# Patient Record
Sex: MALE | Race: WHITE | Employment: OTHER | URBAN - METROPOLITAN AREA
[De-identification: names, ages, dates, MRNs, and addresses within clinical notes are randomized per-mention and may not be internally consistent; named-entity substitution may affect disease eponyms.]

---

## 2019-12-20 ENCOUNTER — HOSPITAL ENCOUNTER (OUTPATIENT)
Dept: ULTRASOUND IMAGING | Age: 44
Discharge: HOME | End: 2019-12-22

## 2019-12-20 DIAGNOSIS — L53.9 ERYTHEMATOUS CONDITION: ICD-10-CM

## 2019-12-20 DIAGNOSIS — M79.601 PAIN OF RIGHT ARM: ICD-10-CM

## 2019-12-20 PROCEDURE — 93971 EXTREMITY STUDY: CPT

## 2021-01-28 ENCOUNTER — OFFICE VISIT (OUTPATIENT)
Dept: FAMILY MEDICINE CLINIC | Age: 46
End: 2021-01-28

## 2021-01-28 VITALS
SYSTOLIC BLOOD PRESSURE: 148 MMHG | TEMPERATURE: 97.9 F | RESPIRATION RATE: 18 BRPM | HEART RATE: 91 BPM | WEIGHT: 236 LBS | DIASTOLIC BLOOD PRESSURE: 80 MMHG | OXYGEN SATURATION: 98 %

## 2021-01-28 DIAGNOSIS — L03.90 CELLULITIS, UNSPECIFIED CELLULITIS SITE: ICD-10-CM

## 2021-01-28 DIAGNOSIS — L02.91 ABSCESS: Primary | ICD-10-CM

## 2021-01-28 PROCEDURE — 99213 OFFICE O/P EST LOW 20 MIN: CPT | Performed by: PHYSICIAN ASSISTANT

## 2021-01-28 NOTE — PROGRESS NOTES
2021   Deandra [address] [place] [address]  [phone]    Hien qian [facility]  : 1975  Age: 39 y.o. Sex: male    Subjective:  Chief Complaint   Patient presents with    Cyst     on chin       HPI:  The patient states that they have noticed erythema over the chin for the last 14 days. The patient states that the area has gradually increased in redness and size since it started. Patient states the wound is red no warmth. The patient denies any wounds or breaks in the skin. Denies any trauma or injury. Denies fever of chills. Denies any drainage. Denies red streaking. Denies nausea, vomiting, malaise and or fatigue. Patient comes for evaluation. ROS:  Positive and pertinent negatives as per HPI. All other systems are reviewed and negative. No current outpatient medications on file. No Known Allergies     Objective:  Vitals:    21 1650   BP: (!) 148/80   Site: Left Upper Arm   Position: Sitting   Cuff Size: Medium Adult   Pulse: 91   Resp: 18   Temp: 97.9 °F (36.6 °C)   TempSrc: Temporal   SpO2: 98%   Weight: 236 lb (107 kg)        Exam:  Const: Appears healthy and well developed. No signs of acute distress present. Head/Face: Head is normocephalic, atraumatic. Facies is symmetric. ENMT: Buccal mucosa moist.  Neck: Trachea midline. No lymphoadenopathy or meningeal signs noted. Resp: No respiratory distress. Musculo: Pulses are equal bilaterally. Skin: Dry and warm. The patient does have erythema measuring 2 x 2.5 cm area to the chin no warmth with induration without fluctuance. It is tender to palpation. No open wounds or soft tissue swelling. No abrasions or ecchymosis is noted. No red streaking or lymphadenopathy noted. Neuro: Alert and oriented x3. Speech is intact. Psych: Mood and affect are appropriate to situation. I do not feel that this abscess is ready to be drained at this time.   I did instruct patient warm compresses several times daily for 15 minutes at a time. I did instruct patient the importance of following up with PCP for recheck in 2 to 3 days. ER warning signs given  Discussed with patient the use of probiotics to assist with adverse GI effects including C-diff. MARIA ISABEL was seen today for cyst.    Diagnoses and all orders for this visit:    Abscess    Cellulitis, unspecified cellulitis site    Patient was given a prescription for Bactrim and Keflex follow-up with his PCP in 2 to 3 days  Return for Follow up with PCP in 2-3 days for re-evaluation. .    Seen By:    AKIKO Garcia

## 2021-02-25 ENCOUNTER — OFFICE VISIT (OUTPATIENT)
Dept: FAMILY MEDICINE CLINIC | Age: 46
End: 2021-02-25

## 2021-02-25 VITALS
OXYGEN SATURATION: 96 % | HEART RATE: 98 BPM | WEIGHT: 234 LBS | SYSTOLIC BLOOD PRESSURE: 142 MMHG | DIASTOLIC BLOOD PRESSURE: 90 MMHG | TEMPERATURE: 97.9 F

## 2021-02-25 DIAGNOSIS — L03.90 CELLULITIS, UNSPECIFIED CELLULITIS SITE: Primary | ICD-10-CM

## 2021-02-25 PROCEDURE — 99213 OFFICE O/P EST LOW 20 MIN: CPT | Performed by: PHYSICIAN ASSISTANT

## 2021-02-25 RX ORDER — CEPHALEXIN 500 MG/1
500 CAPSULE ORAL 3 TIMES DAILY
Qty: 30 CAPSULE | Refills: 0 | Status: SHIPPED
Start: 2021-02-25 | End: 2021-03-23

## 2021-02-25 RX ORDER — SULFAMETHOXAZOLE AND TRIMETHOPRIM 800; 160 MG/1; MG/1
1 TABLET ORAL 2 TIMES DAILY
Qty: 14 TABLET | Refills: 0 | Status: SHIPPED | OUTPATIENT
Start: 2021-02-25 | End: 2021-03-04

## 2021-02-25 NOTE — PROGRESS NOTES
2021   Deandra [address]  [address] [facility]  [phone]    Breezy Zamudio [facility] ([place], [place]  : 1975  Age: 39 y.o. Sex: male    Subjective:  Chief Complaint   Patient presents with    Cellulitis     follow up chin        HPI:  The patient had cellulitis abscess over a month ago states it resolved with antibiotics. Patient states he noticed noticed erythema over the left side of the face below the lower lip for the last few days. The patient states that the area has gradually increased in redness and size since it started. Patient states the wound is red and warm. The patient denies any wounds or breaks in the skin. Denies any trauma or injury. Denies fever of chills. Denies any drainage. Denies red streaking. Denies nausea, vomiting, malaise and or fatigue. Patient comes for evaluation. ROS:  Positive and pertinent negatives as per HPI. All other systems are reviewed and negative. Current Outpatient Medications:     sulfamethoxazole-trimethoprim (BACTRIM DS;SEPTRA DS) 800-160 MG per tablet, Take 1 tablet by mouth 2 times daily for 7 days, Disp: 14 tablet, Rfl: 0    cephALEXin (KEFLEX) 500 MG capsule, Take 1 capsule by mouth 3 times daily, Disp: 30 capsule, Rfl: 0   Allergies   Allergen Reactions    Pcn [Penicillins]      As a child         Objective:  Vitals:    21 1613   BP: (!) 142/90   Pulse: 98   Temp: 97.9 °F (36.6 °C)   TempSrc: Temporal   SpO2: 96%   Weight: 234 lb (106.1 kg)        Exam:  Const: Appears healthy and well developed. No signs of acute distress present. Head/Face: Head is normocephalic, atraumatic. Facies is symmetric. ENMT: Buccal mucosa moist.  Neck: Trachea midline. No lymphoadenopathy or meningeal signs noted. Resp: No respiratory distress. Musculo: Pulses are equal bilaterally. Skin: Dry and warm. The patient does have erythema no warmth to the face on the left side inferior to the left lower lip.   It measures approximately 1.5 x 2 cm without fluctuance or induration. It is tender to palpation. No open wounds or soft tissue swelling. No abrasions or ecchymosis is noted. No red streaking or lymphadenopathy noted. Neuro: Alert and oriented x3. Speech is intact. Psych: Mood and affect are appropriate to situation. Discussed with patient the use of probiotics to assist with adverse GI effects including C-diff. MARIA ISABEL was seen today for cellulitis. Diagnoses and all orders for this visit:    Cellulitis, unspecified cellulitis site    Other orders  -     sulfamethoxazole-trimethoprim (BACTRIM DS;SEPTRA DS) 800-160 MG per tablet; Take 1 tablet by mouth 2 times daily for 7 days  -     cephALEXin (KEFLEX) 500 MG capsule; Take 1 capsule by mouth 3 times daily    Patient was instructed warm compresses to the area several times daily for 15 minutes at a time  Return for Follow up with PCP in 5 days for re-evaluation. .    Seen By:    AKIKO Hobbs

## 2021-03-02 ENCOUNTER — TELEPHONE (OUTPATIENT)
Dept: ADMINISTRATIVE | Age: 46
End: 2021-03-02

## 2021-03-02 ENCOUNTER — NURSE TRIAGE (OUTPATIENT)
Dept: OTHER | Facility: CLINIC | Age: 46
End: 2021-03-02

## 2021-03-02 NOTE — TELEPHONE ENCOUNTER
Patient called ECC/PSC Tk with red flag complaint to establish care with Layton Batista    Brief description of triage:     Triage indicates for patient to see today in office. If no availability go to Er/Ucc today    Care advice provided, patient verbalizes understanding; denies any other questions or concerns; instructed to call back for any new or worsening symptoms. Writer provided warm transfer to  at [facility] [place] for appointment scheduling. Attention Provider: Thank you for allowing me to participate in the care of your patient. The patient was connected to triage in response to information provided to the ECC. Please do not respond through this encounter as the response is not directed to a shared pool. Reason for Disposition   MODERATE swelling of both ankles (e.g., swelling extends up to the knees) AND new onset or worsening    Answer Assessment - Initial Assessment Questions  1. ONSET: \"When did the swelling start? \" (e.g., minutes, hours, days)      Last Friday    2. LOCATION: \"What part of the leg is swollen? \"  \"Are both legs swollen or just one leg? \"      Bilateral lower extremities    3. SEVERITY: \"How bad is the swelling? \" (e.g., localized; mild, moderate, severe)   - Localized - small area of swelling localized to one leg   - MILD pedal edema - swelling limited to foot and ankle, pitting edema < 1/4 inch (6 mm) deep, rest and elevation eliminate most or all swelling   - MODERATE edema - swelling of lower leg to knee, pitting edema > 1/4 inch (6 mm) deep, rest and elevation only partially reduce swelling   - SEVERE edema - swelling extends above knee, facial or hand swelling present       Swelling in feet and ankles up to knees, previously up to his thighs but has improved    4. REDNESS: \"Does the swelling look red or infected? \"      None in legs    5. PAIN: \"Is the swelling painful to touch? \" If so, ask: \"How painful is it? \"   (Scale 1-10; mild, moderate or severe)      Hurts with movement, holding onto things to walk, 8/10    6. FEVER: \"Do you have a fever? \" If so, ask: \"What is it, how was it measured, and when did it start? \"       No fever    7. CAUSE: \"What do you think is causing the leg swelling? \"      Unsure    8. MEDICAL HISTORY: \"Do you have a history of heart failure, kidney disease, liver failure, or cancer? \"      None    9. RECURRENT SYMPTOM: \"Have you had leg swelling before? \" If so, ask: \"When was the last time? \" \"What happened that time? \"      No previous encounters    10. OTHER SYMPTOMS: \"Do you have any other symptoms? \" (e.g., chest pain, difficulty breathing)        Pain and redness in hands when exposed to cold weather    11. PREGNANCY: \"Is there any chance you are pregnant? \" \"When was your last menstrual period? \"        NA    Protocols used: LEG SWELLING AND EDEMA-ADULT-OH

## 2021-03-02 NOTE — TELEPHONE ENCOUNTER
Pt called in wanting to establish with a new provider in the [place] office. He stated that he needed a PCP and also that he is having problems with his legs/ankles and feet swelling. He states that at times he has numbness and tingling. Call was transferred to [facility].

## 2021-03-02 NOTE — TELEPHONE ENCOUNTER
Tried to call pt after receiving message back from nurse triage, pt did not answer and his mailbox is full, cannot leave a message. See nurse triage note.

## 2021-03-03 ENCOUNTER — OFFICE VISIT (OUTPATIENT)
Dept: PRIMARY CARE CLINIC | Age: 46
End: 2021-03-03
Payer: COMMERCIAL

## 2021-03-03 VITALS
RESPIRATION RATE: 22 BRPM | HEART RATE: 123 BPM | BODY MASS INDEX: 35.01 KG/M2 | WEIGHT: 231 LBS | TEMPERATURE: 98.4 F | OXYGEN SATURATION: 99 % | HEIGHT: 68 IN | SYSTOLIC BLOOD PRESSURE: 128 MMHG | DIASTOLIC BLOOD PRESSURE: 82 MMHG

## 2021-03-03 DIAGNOSIS — M79.605 LEG PAIN, BILATERAL: ICD-10-CM

## 2021-03-03 DIAGNOSIS — Z13.220 SCREENING CHOLESTEROL LEVEL: ICD-10-CM

## 2021-03-03 DIAGNOSIS — M79.604 LEG PAIN, BILATERAL: ICD-10-CM

## 2021-03-03 DIAGNOSIS — Z12.5 PROSTATE CANCER SCREENING: ICD-10-CM

## 2021-03-03 DIAGNOSIS — R53.83 FATIGUE, UNSPECIFIED TYPE: ICD-10-CM

## 2021-03-03 DIAGNOSIS — M25.542 JOINT PAIN IN BOTH HANDS: ICD-10-CM

## 2021-03-03 DIAGNOSIS — M72.2 PLANTAR FASCIITIS, BILATERAL: Primary | ICD-10-CM

## 2021-03-03 DIAGNOSIS — F32.1 CURRENT MODERATE EPISODE OF MAJOR DEPRESSIVE DISORDER WITHOUT PRIOR EPISODE (HCC): ICD-10-CM

## 2021-03-03 DIAGNOSIS — M25.541 JOINT PAIN IN BOTH HANDS: ICD-10-CM

## 2021-03-03 DIAGNOSIS — R60.0 BILATERAL LOWER EXTREMITY EDEMA: ICD-10-CM

## 2021-03-03 PROCEDURE — 99214 OFFICE O/P EST MOD 30 MIN: CPT | Performed by: FAMILY MEDICINE

## 2021-03-03 SDOH — ECONOMIC STABILITY: INCOME INSECURITY: HOW HARD IS IT FOR YOU TO PAY FOR THE VERY BASICS LIKE FOOD, HOUSING, MEDICAL CARE, AND HEATING?: NOT HARD AT ALL

## 2021-03-03 SDOH — ECONOMIC STABILITY: TRANSPORTATION INSECURITY
IN THE PAST 12 MONTHS, HAS THE LACK OF TRANSPORTATION KEPT YOU FROM MEDICAL APPOINTMENTS OR FROM GETTING MEDICATIONS?: NOT ASKED

## 2021-03-03 SDOH — HEALTH STABILITY: MENTAL HEALTH: HOW OFTEN DO YOU HAVE A DRINK CONTAINING ALCOHOL?: 2-4 TIMES A MONTH

## 2021-03-03 SDOH — ECONOMIC STABILITY: FOOD INSECURITY: WITHIN THE PAST 12 MONTHS, THE FOOD YOU BOUGHT JUST DIDN'T LAST AND YOU DIDN'T HAVE MONEY TO GET MORE.: NEVER TRUE

## 2021-03-03 SDOH — ECONOMIC STABILITY: TRANSPORTATION INSECURITY
IN THE PAST 12 MONTHS, HAS LACK OF TRANSPORTATION KEPT YOU FROM MEETINGS, WORK, OR FROM GETTING THINGS NEEDED FOR DAILY LIVING?: NOT ASKED

## 2021-03-03 ASSESSMENT — PATIENT HEALTH QUESTIONNAIRE - PHQ9
SUM OF ALL RESPONSES TO PHQ QUESTIONS 1-9: 2
1. LITTLE INTEREST OR PLEASURE IN DOING THINGS: 1
SUM OF ALL RESPONSES TO PHQ QUESTIONS 1-9: 2
SUM OF ALL RESPONSES TO PHQ9 QUESTIONS 1 & 2: 2

## 2021-03-03 ASSESSMENT — ENCOUNTER SYMPTOMS
CHEST TIGHTNESS: 0
GASTROINTESTINAL NEGATIVE: 1

## 2021-03-03 NOTE — PROGRESS NOTES
3/3/21  MARIA ISABEL [place] : 1975 Sex: male  Age: 39 y.o. Assessment and Plan:  MARIA ISABEL was seen today for establish care. Diagnoses and all orders for this visit:    Plantar fasciitis, bilateral    Bilateral lower extremity edema  -     Basic Metabolic Panel; Future  -     Hepatic Function Panel; Future  -     CBC Auto Differential; Future  -     URINALYSIS; Future  -     TSH; Future  -     T4, FREE; Future  -     PROTIME-INR; Future    Leg pain, bilateral    Joint pain in both hands    Screening cholesterol level  -     Hepatic Function Panel; Future  -     Lipid Panel; Future    Prostate cancer screening  -     PSA screening; Future    Fatigue, unspecified type  -     TSH; Future  -     T4, FREE; Future    Current moderate episode of major depressive disorder without prior episode (HCC)  -     TSH; Future  -     T4, FREE; Future    Unclear etiology of the patient's acute lower extremity edema. Blood work and urine ordered, patient will return tomorrow morning to do this fasting. Assuming his kidney function is appropriate, we will start diuretics if able and continue work-up as necessary. Precautions reviewed, patient advised to proceed to the emergency room immediately if any red flags develop. Patient is amenable to restarting an SSRI; we will hold off temporarily until we have further insight into what else is going on with him. Follow-up in 1 week. Return in about 1 week (around 3/10/2021). Chief Complaint   Patient [facility] with   [facility].       HPI   Patient here today to establish care and with acute concerns. He states that over the past week he has had bilateral lower extremity edema which is new for him. This started approximately 1 week ago and was actually a little bit worse previously, but he did pass a lot of urine one day and that seemed to help a bit.   When it was so bad, the patient had to walk a little bit funny, and he thinks that has contributed to severe pains that he is having in his lower legs as well, mostly noted in the hips, knees, ankles, as well as his bilateral butt cheeks. This discomfort also only started last week. Patient reports that previously he has been healthy, however he does have a history of cocaine use, and more recently he has been using methamphetamines a couple times a week. He has been to rehab in the past, and is not currently considering going back at this time. He does have a history of plantar fasciitis, and will be seeing his foot doctor regarding this tomorrow. He also reports longstanding bilateral hand pain, due to the work that he does. About a year ago he also developed severe cold intolerance in his hands, noting that they would turn red if they got too cold. Otherwise he has been fine per his report today. Problem list reviewed and updated in full with patient today as necessary. A comprehensive ROS was negative, except as documented above. Review of Systems   Respiratory: Negative for chest tightness. Patient is noting a little bit of shortness of breath with activity, but he thinks it is due to the amount of pain he has moving around right now. He was not having any shortness of breath or breathing difficulties prior to the swelling starting, and he denies any problems when he is sitting at rest.  No cough. Cardiovascular: Negative for chest pain. Gastrointestinal: Negative. Genitourinary:        Patient did have increased frequency a few days ago, but otherwise his urine has been normal   Psychiatric/Behavioral: Negative for suicidal ideas. Patient does report a history of depression for which he was previously on Zoloft. This seemed to cause some issues with irritable bowel and the patient discontinued the medication because he also did not seem to think it was particularly helpful.   He believes he was prescribed another medication at that time, but does not remember what it was, and does not think he ever actually took it. He denies any history of, or current, suicide ideation. Current Outpatient Medications:     sulfamethoxazole-trimethoprim (BACTRIM DS;SEPTRA DS) 800-160 MG per tablet, Take 1 tablet by mouth 2 times daily for 7 days, Disp: 14 tablet, Rfl: 0    cephALEXin (KEFLEX) 500 MG capsule, Take 1 capsule by mouth 3 times daily, Disp: 30 capsule, Rfl: 0  Allergies   Allergen Reactions    Pcn [Penicillins]      As a child        Pt's past medical and surgical history were updated as necessary today   Pt's family and social history were updated as necessary today          Vitals:    03/03/21 1004   BP: 128/82   Pulse: 123   Resp: 22   Temp: 98.4 °F (36.9 °C)   TempSrc: Temporal   SpO2: 99%   Weight: 231 lb (104.8 kg)   Height: 5' 8\" (1.727 m)       Physical Exam  Constitutional:       Appearance: Normal appearance. HENT:      Head: Normocephalic and atraumatic. Eyes:      Conjunctiva/sclera: Conjunctivae normal.   Cardiovascular:      Rate and Rhythm: Regular rhythm. Heart sounds: Normal heart sounds. Comments: Borderline or slightly tachycardic   Pulmonary:      Effort: Pulmonary effort is normal.      Comments: No clear adventitious sounds but somewhat diminished bases B/L   Musculoskeletal:        Legs:       Comments: Obvious edema as noted    Skin:     Comments: Skin is overall very dry. Patient does have some hyperpigmentation noted over the bilateral lower extremities and small dots and patches most consistent with vascular changes. There is no erythema or warmth to the skin. There is no evidence of skin breakdown. Neurological:      General: No focal deficit present. Mental Status: He is alert. Psychiatric:         Mood and Affect: Mood normal.         Behavior: Behavior normal.         Thought Content:  Thought content normal.               Seen By:  Maria Eugenia Bajwa MD

## 2021-03-05 DIAGNOSIS — R60.0 BILATERAL LOWER EXTREMITY EDEMA: ICD-10-CM

## 2021-03-05 DIAGNOSIS — Z13.220 SCREENING CHOLESTEROL LEVEL: ICD-10-CM

## 2021-03-05 DIAGNOSIS — R53.83 FATIGUE, UNSPECIFIED TYPE: ICD-10-CM

## 2021-03-05 DIAGNOSIS — Z12.5 PROSTATE CANCER SCREENING: ICD-10-CM

## 2021-03-05 DIAGNOSIS — F32.1 CURRENT MODERATE EPISODE OF MAJOR DEPRESSIVE DISORDER WITHOUT PRIOR EPISODE (HCC): ICD-10-CM

## 2021-03-05 LAB
ALBUMIN SERPL-MCNC: 4 G/DL (ref 3.5–5.2)
ALP BLD-CCNC: 81 U/L (ref 40–129)
ALT SERPL-CCNC: 15 U/L (ref 0–40)
ANION GAP SERPL CALCULATED.3IONS-SCNC: 12 MMOL/L (ref 7–16)
AST SERPL-CCNC: 18 U/L (ref 0–39)
BASOPHILS ABSOLUTE: 0.07 E9/L (ref 0–0.2)
BASOPHILS RELATIVE PERCENT: 0.7 % (ref 0–2)
BILIRUB SERPL-MCNC: 0.4 MG/DL (ref 0–1.2)
BILIRUBIN DIRECT: <0.2 MG/DL (ref 0–0.3)
BILIRUBIN URINE: NEGATIVE
BILIRUBIN, INDIRECT: NORMAL MG/DL (ref 0–1)
BLOOD, URINE: NEGATIVE
BUN BLDV-MCNC: 12 MG/DL (ref 6–20)
CALCIUM SERPL-MCNC: 9.3 MG/DL (ref 8.6–10.2)
CHLORIDE BLD-SCNC: 99 MMOL/L (ref 98–107)
CLARITY: CLEAR
CO2: 27 MMOL/L (ref 22–29)
COLOR: YELLOW
CREAT SERPL-MCNC: 1 MG/DL (ref 0.7–1.2)
EOSINOPHILS ABSOLUTE: 0.12 E9/L (ref 0.05–0.5)
EOSINOPHILS RELATIVE PERCENT: 1.1 % (ref 0–6)
GFR AFRICAN AMERICAN: >60
GFR NON-AFRICAN AMERICAN: >60 ML/MIN/1.73
GLUCOSE BLD-MCNC: 93 MG/DL (ref 74–99)
GLUCOSE URINE: NEGATIVE MG/DL
HCT VFR BLD CALC: 46.7 % (ref 37–54)
HEMOGLOBIN: 14.9 G/DL (ref 12.5–16.5)
IMMATURE GRANULOCYTES #: 0.05 E9/L
IMMATURE GRANULOCYTES %: 0.5 % (ref 0–5)
INR BLD: 1.2
KETONES, URINE: NEGATIVE MG/DL
LEUKOCYTE ESTERASE, URINE: NEGATIVE
LYMPHOCYTES ABSOLUTE: 1.35 E9/L (ref 1.5–4)
LYMPHOCYTES RELATIVE PERCENT: 12.9 % (ref 20–42)
MCH RBC QN AUTO: 30.7 PG (ref 26–35)
MCHC RBC AUTO-ENTMCNC: 31.9 % (ref 32–34.5)
MCV RBC AUTO: 96.3 FL (ref 80–99.9)
MONOCYTES ABSOLUTE: 0.85 E9/L (ref 0.1–0.95)
MONOCYTES RELATIVE PERCENT: 8.1 % (ref 2–12)
NEUTROPHILS ABSOLUTE: 8.02 E9/L (ref 1.8–7.3)
NEUTROPHILS RELATIVE PERCENT: 76.7 % (ref 43–80)
NITRITE, URINE: NEGATIVE
PDW BLD-RTO: 12.2 FL (ref 11.5–15)
PH UA: 6 (ref 5–9)
PLATELET # BLD: 425 E9/L (ref 130–450)
PMV BLD AUTO: 9.6 FL (ref 7–12)
POTASSIUM SERPL-SCNC: 4.7 MMOL/L (ref 3.5–5)
PROSTATE SPECIFIC ANTIGEN: 1.54 NG/ML (ref 0–4)
PROTEIN UA: NEGATIVE MG/DL
PROTHROMBIN TIME: 13 SEC (ref 9.3–12.4)
RBC # BLD: 4.85 E12/L (ref 3.8–5.8)
SODIUM BLD-SCNC: 138 MMOL/L (ref 132–146)
SPECIFIC GRAVITY UA: 1.02 (ref 1–1.03)
T4 FREE: 1.24 NG/DL (ref 0.93–1.7)
TOTAL PROTEIN: 7.4 G/DL (ref 6.4–8.3)
TSH SERPL DL<=0.05 MIU/L-ACNC: 2.75 UIU/ML (ref 0.27–4.2)
UROBILINOGEN, URINE: 4 E.U./DL
WBC # BLD: 10.5 E9/L (ref 4.5–11.5)

## 2021-03-06 LAB
CHOLESTEROL, TOTAL: 172 MG/DL (ref 0–199)
HDLC SERPL-MCNC: 43 MG/DL
LDL CHOLESTEROL CALCULATED: 115 MG/DL (ref 0–99)
TRIGL SERPL-MCNC: 69 MG/DL (ref 0–149)
VLDLC SERPL CALC-MCNC: 14 MG/DL

## 2021-03-10 ENCOUNTER — OFFICE VISIT (OUTPATIENT)
Dept: PRIMARY CARE CLINIC | Age: 46
End: 2021-03-10
Payer: COMMERCIAL

## 2021-03-10 VITALS
DIASTOLIC BLOOD PRESSURE: 76 MMHG | OXYGEN SATURATION: 98 % | BODY MASS INDEX: 34.56 KG/M2 | HEIGHT: 68 IN | SYSTOLIC BLOOD PRESSURE: 118 MMHG | TEMPERATURE: 98.2 F | RESPIRATION RATE: 20 BRPM | WEIGHT: 228 LBS | HEART RATE: 83 BPM

## 2021-03-10 DIAGNOSIS — G47.9 SLEEP DISTURBANCES: ICD-10-CM

## 2021-03-10 DIAGNOSIS — M79.605 BILATERAL LEG PAIN: ICD-10-CM

## 2021-03-10 DIAGNOSIS — R60.0 BILATERAL LOWER EXTREMITY EDEMA: Primary | ICD-10-CM

## 2021-03-10 DIAGNOSIS — R20.0 NUMBNESS OF FINGERS OF BOTH HANDS: ICD-10-CM

## 2021-03-10 DIAGNOSIS — Z87.39 HISTORY OF GOUT: ICD-10-CM

## 2021-03-10 DIAGNOSIS — M79.641 BILATERAL HAND PAIN: ICD-10-CM

## 2021-03-10 DIAGNOSIS — R40.0 DAYTIME SOMNOLENCE: ICD-10-CM

## 2021-03-10 DIAGNOSIS — M79.604 BILATERAL LEG PAIN: ICD-10-CM

## 2021-03-10 DIAGNOSIS — M79.642 BILATERAL HAND PAIN: ICD-10-CM

## 2021-03-10 PROCEDURE — 99214 OFFICE O/P EST MOD 30 MIN: CPT | Performed by: FAMILY MEDICINE

## 2021-03-10 RX ORDER — POTASSIUM CHLORIDE 750 MG/1
TABLET, EXTENDED RELEASE ORAL
Qty: 30 TABLET | Refills: 0 | Status: SHIPPED
Start: 2021-03-10 | End: 2021-04-22 | Stop reason: SDUPTHER

## 2021-03-10 RX ORDER — FUROSEMIDE 20 MG/1
TABLET ORAL
Qty: 30 TABLET | Refills: 0 | Status: SHIPPED
Start: 2021-03-10 | End: 2021-04-22 | Stop reason: SDUPTHER

## 2021-03-10 NOTE — PROGRESS NOTES
3/10/21  MARIA ISABEL SMITH : 1975 Sex: male  Age: 39 y.o. Assessment and Plan:  MARIA ISABEL was seen today for discuss labs. Diagnoses and all orders for this visit:    Bilateral lower extremity edema  -     furosemide (LASIX) 20 MG tablet; One tab daily PRN swelling  -     potassium chloride (KLOR-CON M) 10 MEQ extended release tablet; One tab daily if taking lasix    Numbness of fingers of both hands  -     CCP Antibodies, IGG/IGA; Future  -     Rheumatoid Factor; Future  -     Sedimentation Rate; Future  -     C-Reactive Protein; Future  -     MILAGROS; Future    Bilateral hand pain  -     CCP Antibodies, IGG/IGA; Future  -     Rheumatoid Factor; Future  -     Sedimentation Rate; Future  -     C-Reactive Protein; Future  -     MILAGROS; Future    History of gout    Daytime somnolence  -     Gisella Camp DO, Sleep Medicine, [place]    Sleep disturbances  -     Junior Horton DO, Sleep Medicine, [place]    Bilateral leg pain  -     CCP Antibodies, IGG/IGA; Future  -     Rheumatoid Factor; Future  -     Sedimentation Rate; Future  -     C-Reactive Protein; Future  -     MILAGROS; Future    Long discussion with the patient regarding doing bilateral lower extremity ultrasounds. Given the asymmetric nature of his swelling and the fact that it is that his lower ankles, without any pain in the calves, I do not think this represents DVTs. However I did discuss with the patient that we would need ultrasounds in order to completely rule this out. He thought about it, but does not want to proceed with this at this time. He will get back in touch with his podiatrist if he would like to try to do them at another time. We will start the patient on Lasix and potassium. We will check further labs to evaluate for the possibility of an underlying rheumatological condition.   I did discuss with the patient that these are not yes/no diagnoses based solely on blood work, and we may ultimately refer him for further evaluation. Patient will also be referred to sleep medicine for further evaluation of his sleep complaints    Reassess further in a couple of weeks. Return in about 2 weeks (around 3/24/2021).         Chief Complaint   Patient presents with   Susie NAIDU  Pt here for routine f/u  He is still not great, not much different than when I last saw him  Patient saw podiatry yesterday  He was supposed to get stat bilateral lower extremity ultrasounds, but he states he never got a call from [facility] about doing them  Podiatry was also concerned about whether or not there may be a rheumatoid arthritis or other similar condition, or whether this was a reaction to Keflex since the patient has an allergy to penicillins    Patient states that he has had 1 tall boy the past 3 days, because this seems to help him start to urinate and he is able to pee out a lot of the excess fluid he thinks he is retaining once he starts  He denies any recent drug use    Patient also has some concerns about some sleep issues that he has been having for a long time  He states his ex-wife noted some episodes of what sounds like apnea previously  He is also feels like he starts dreaming immediately upon falling asleep, and is wondering whether he has some form of narcolepsy  Patient has been putting off seeing a sleep doctor, but would like to do that at this time  He does sleep laying down, but does not get a lot of sleep at any one time, so this is possibly contributing to some of his swelling issues    Patient's recent blood work was reviewed with him in full  Component      Latest Ref Rng & Units 3/5/2021 3/5/2021 3/5/2021 3/5/2021          12:50 PM 12:48 PM 12:48 PM 12:48 PM   WBC      4.5 - 11.5 E9/L       RBC      3.80 - 5.80 E12/L       Hemoglobin Quant      12.5 - 16.5 g/dL       Hematocrit      37.0 - 54.0 %       MCV      80.0 - 99.9 fL       MCH      26.0 - 35.0 pg       MCHC      32.0 - 34.5 %       RDW      11.5 - 15.0 fL       Platelet Count      915 - 450 E9/L       MPV      7.0 - 12.0 fL       Neutrophils %      43.0 - 80.0 %       Immature Granulocytes %      0.0 - 5.0 %       Lymphocyte %      20.0 - 42.0 %       Monocytes %      2.0 - 12.0 %       Eosinophils %      0.0 - 6.0 %       Basophils %      0.0 - 2.0 %       Neutrophils Absolute      1.80 - 7.30 E9/L       Immature Granulocytes #      E9/L       Lymphocytes Absolute      1.50 - 4.00 E9/L       Monocytes Absolute      0.10 - 0.95 E9/L       Eosinophils Absolute      0.05 - 0.50 E9/L       Basophils Absolute      0.00 - 0.20 E9/L       Color, UA      Straw/Yellow Yellow      Clarity, UA      Clear Clear      Glucose, UA      Negative mg/dL Negative      Bilirubin, Urine      Negative Negative      Ketones, Urine      Negative mg/dL Negative      Specific Gravity, UA      1.005 - 1.030 1.025      Blood, Urine      Negative Negative      pH, UA      5.0 - 9.0 6.0      Protein, UA      Negative mg/dL Negative      Urobilinogen, Urine      <2.0 E.U./dL 4.0 (A)      Nitrite, Urine      Negative Negative      Leukocyte Esterase, Urine      Negative Negative      Sodium      132 - 146 mmol/L    138   Potassium      3.5 - 5.0 mmol/L    4.7   Chloride      98 - 107 mmol/L    99   CO2      22 - 29 mmol/L    27   Anion Gap      7 - 16 mmol/L    12   Glucose      74 - 99 mg/dL    93   BUN      6 - 20 mg/dL    12   Creatinine      0.7 - 1.2 mg/dL    1.0   GFR Non-African American      >=60 mL/min/1.73    >60   GFR           >60   Calcium      8.6 - 10.2 mg/dL    9.3   Total Protein      6.4 - 8.3 g/dL   7.4    Albumin      3.5 - 5.2 g/dL   4.0    Alk Phos      40 - 129 U/L   81    ALT      0 - 40 U/L   15    AST      0 - 39 U/L   18    Bilirubin      0.0 - 1.2 mg/dL   0.4    Bilirubin, Direct      0.0 - 0.3 mg/dL   <0.2    Bilirubin, Indirect      0.0 - 1.0 mg/dL   see below    Cholesterol, Total      0 - 199 mg/dL  172     Triglycerides      0 - 149 mg/dL 69     HDL Cholesterol      >40 mg/dL  43     LDL Calculated      0 - 99 mg/dL  115 (H)     VLDL Cholesterol Calculated      mg/dL  14     Prothrombin Time      9.3 - 12.4 sec       INR             TSH      0.270 - 4.200 uIU/mL       T4 Free      0.93 - 1.70 ng/dL       Prostatic Specific Ag      0.00 - 4.00 ng/mL         Component      Latest Ref Rng & Units 3/5/2021 3/5/2021 3/5/2021 3/5/2021          12:48 PM 12:48 PM 12:48 PM 12:48 PM   WBC      4.5 - 11.5 E9/L       RBC      3.80 - 5.80 E12/L       Hemoglobin Quant      12.5 - 16.5 g/dL       Hematocrit      37.0 - 54.0 %       MCV      80.0 - 99.9 fL       MCH      26.0 - 35.0 pg       MCHC      32.0 - 34.5 %       RDW      11.5 - 15.0 fL       Platelet Count      879 - 450 E9/L       MPV      7.0 - 12.0 fL       Neutrophils %      43.0 - 80.0 %       Immature Granulocytes %      0.0 - 5.0 %       Lymphocyte %      20.0 - 42.0 %       Monocytes %      2.0 - 12.0 %       Eosinophils %      0.0 - 6.0 %       Basophils %      0.0 - 2.0 %       Neutrophils Absolute      1.80 - 7.30 E9/L       Immature Granulocytes #      E9/L       Lymphocytes Absolute      1.50 - 4.00 E9/L       Monocytes Absolute      0.10 - 0.95 E9/L       Eosinophils Absolute      0.05 - 0.50 E9/L       Basophils Absolute      0.00 - 0.20 E9/L       Color, UA      Straw/Yellow       Clarity, UA      Clear       Glucose, UA      Negative mg/dL       Bilirubin, Urine      Negative       Ketones, Urine      Negative mg/dL       Specific Gravity, UA      1.005 - 1.030       Blood, Urine      Negative       pH, UA      5.0 - 9.0       Protein, UA      Negative mg/dL       Urobilinogen, Urine      <2.0 E.U./dL       Nitrite, Urine      Negative       Leukocyte Esterase, Urine      Negative       Sodium      132 - 146 mmol/L       Potassium      3.5 - 5.0 mmol/L       Chloride      98 - 107 mmol/L       CO2      22 - 29 mmol/L       Anion Gap      7 - 16 mmol/L       Glucose      74 - 99 mg/dL Negative    Ketones, Urine      Negative mg/dL    Specific Gravity, UA      1.005 - 1.030    Blood, Urine      Negative    pH, UA      5.0 - 9.0    Protein, UA      Negative mg/dL    Urobilinogen, Urine      <2.0 E.U./dL    Nitrite, Urine      Negative    Leukocyte Esterase, Urine      Negative    Sodium      132 - 146 mmol/L    Potassium      3.5 - 5.0 mmol/L    Chloride      98 - 107 mmol/L    CO2      22 - 29 mmol/L    Anion Gap      7 - 16 mmol/L    Glucose      74 - 99 mg/dL    BUN      6 - 20 mg/dL    Creatinine      0.7 - 1.2 mg/dL    GFR Non-African American      >=60 mL/min/1.73    GFR African American          Calcium      8.6 - 10.2 mg/dL    Total Protein      6.4 - 8.3 g/dL    Albumin      3.5 - 5.2 g/dL    Alk Phos      40 - 129 U/L    ALT      0 - 40 U/L    AST      0 - 39 U/L    Bilirubin      0.0 - 1.2 mg/dL    Bilirubin, Direct      0.0 - 0.3 mg/dL    Bilirubin, Indirect      0.0 - 1.0 mg/dL    Cholesterol, Total      0 - 199 mg/dL    Triglycerides      0 - 149 mg/dL    HDL Cholesterol      >40 mg/dL    LDL Calculated      0 - 99 mg/dL    VLDL Cholesterol Calculated      mg/dL    Prothrombin Time      9.3 - 12.4 sec    INR          TSH      0.270 - 4.200 uIU/mL    T4 Free      0.93 - 1.70 ng/dL    Prostatic Specific Ag      0.00 - 4.00 ng/mL        Problem list reviewed and updated in full with patient today as necessary. A comprehensive ROS was negative, except as documented above.        Current Outpatient Medications:     furosemide (LASIX) 20 MG tablet, One tab daily PRN swelling, Disp: 30 tablet, Rfl: 0    potassium chloride (KLOR-CON M) 10 MEQ extended release tablet, One tab daily if taking lasix, Disp: 30 tablet, Rfl: 0    cephALEXin (KEFLEX) 500 MG capsule, Take 1 capsule by mouth 3 times daily (Patient not taking: Reported on 3/10/2021), Disp: 30 capsule, Rfl: 0  Allergies   Allergen Reactions    Pcn [Penicillins]      As a child        Pt's past medical and surgical history were updated as necessary today   Pt's family and social history were updated as necessary today        Vitals:    03/10/21 0914   BP: 118/76   Pulse: 83   Resp: 20   Temp: 98.2 °F (36.8 °C)   TempSrc: Temporal   SpO2: 98%   Weight: 228 lb (103.4 kg)   Height: 5' 8\" (1.727 m)       Physical Exam  Constitutional:       Appearance: Normal appearance. HENT:      Head: Normocephalic and atraumatic. Eyes:      Conjunctiva/sclera: Conjunctivae normal.   Cardiovascular:      Rate and Rhythm: Normal rate and regular rhythm. Heart sounds: Normal heart sounds. Pulmonary:      Effort: Pulmonary effort is normal.      Breath sounds: Normal breath sounds. Abdominal:      Palpations: Abdomen is soft. Tenderness: There is no abdominal tenderness. Musculoskeletal: Normal range of motion. Right lower leg: Edema present. Left lower leg: Edema present. Skin:     General: Skin is warm and dry. Neurological:      General: No focal deficit present. Mental Status: He is alert and oriented to person, place, and time.    Psychiatric:         Mood and Affect: Mood normal.         Behavior: Behavior normal.       Seen By:  Opal Ervin MD

## 2021-03-15 DIAGNOSIS — M79.604 BILATERAL LEG PAIN: ICD-10-CM

## 2021-03-15 DIAGNOSIS — M79.605 BILATERAL LEG PAIN: ICD-10-CM

## 2021-03-15 DIAGNOSIS — M79.642 BILATERAL HAND PAIN: ICD-10-CM

## 2021-03-15 DIAGNOSIS — R20.0 NUMBNESS OF FINGERS OF BOTH HANDS: ICD-10-CM

## 2021-03-15 DIAGNOSIS — M79.641 BILATERAL HAND PAIN: ICD-10-CM

## 2021-03-15 LAB
C-REACTIVE PROTEIN: 7.3 MG/DL (ref 0–0.4)
RHEUMATOID FACTOR: <10 IU/ML (ref 0–13)
SEDIMENTATION RATE, ERYTHROCYTE: 55 MM/HR (ref 0–15)

## 2021-03-16 LAB — ANTI-NUCLEAR ANTIBODY (ANA): NEGATIVE

## 2021-03-19 LAB
Lab: NORMAL
REPORT: NORMAL
THIS TEST SENT TO: NORMAL

## 2021-03-23 ENCOUNTER — OFFICE VISIT (OUTPATIENT)
Dept: PRIMARY CARE CLINIC | Age: 46
End: 2021-03-23
Payer: COMMERCIAL

## 2021-03-23 VITALS
HEIGHT: 68 IN | OXYGEN SATURATION: 98 % | HEART RATE: 116 BPM | SYSTOLIC BLOOD PRESSURE: 136 MMHG | TEMPERATURE: 97.8 F | RESPIRATION RATE: 20 BRPM | BODY MASS INDEX: 34.56 KG/M2 | DIASTOLIC BLOOD PRESSURE: 78 MMHG | WEIGHT: 228 LBS

## 2021-03-23 DIAGNOSIS — L03.211 CELLULITIS OF FACE: ICD-10-CM

## 2021-03-23 DIAGNOSIS — R60.0 BILATERAL LOWER EXTREMITY EDEMA: Primary | ICD-10-CM

## 2021-03-23 DIAGNOSIS — R79.82 ELEVATED C-REACTIVE PROTEIN (CRP): ICD-10-CM

## 2021-03-23 DIAGNOSIS — R70.0 ELEVATED ERYTHROCYTE SEDIMENTATION RATE: ICD-10-CM

## 2021-03-23 DIAGNOSIS — R06.02 SHORTNESS OF BREATH: ICD-10-CM

## 2021-03-23 PROCEDURE — 99214 OFFICE O/P EST MOD 30 MIN: CPT | Performed by: FAMILY MEDICINE

## 2021-03-23 RX ORDER — SULFAMETHOXAZOLE AND TRIMETHOPRIM 800; 160 MG/1; MG/1
1 TABLET ORAL 2 TIMES DAILY
Qty: 14 TABLET | Refills: 0 | Status: SHIPPED | OUTPATIENT
Start: 2021-03-23 | End: 2021-03-30

## 2021-03-23 NOTE — PROGRESS NOTES
3/23/21  MARIA ISABEL Parker [place] : 1975 Sex: male  Age: 39 y.o. Assessment and Plan:  MARIA ISABEL was seen today for other. Diagnoses and all orders for this visit:    Bilateral lower extremity edema  -     Compression Stocking  -     D-DIMER, QUANTITATIVE; Future  -     BASIC METABOLIC PANEL; Future  -     URINALYSIS; Future  -     BRAIN NATRIURETIC PEPTIDE; Future    Cellulitis of face  -     sulfamethoxazole-trimethoprim (BACTRIM DS;SEPTRA DS) 800-160 MG per tablet; Take 1 tablet by mouth 2 times daily for 7 days  -     mupirocin (BACTROBAN) 2 % ointment; Apply 3 times daily. Elevated C-reactive protein (CRP)  -     C-REACTIVE PROTEIN; Future    Elevated erythrocyte sedimentation rate  -     SEDIMENTATION RATE; Future    Shortness of breath  -     BRAIN NATRIURETIC PEPTIDE; Future      Reassess abnormal labs today. We will also check a BNP and a D-dimer. Patient understands if his D-dimer is positive he will be referred for further urgent testing for DVT. He will take the Lasix daily prior to follow-up, to see if this can mobilize any of the extra fluid. He will also wear compression stockings. Discussed with the patient that an EKG may also be helpful, but he declines this today as he needs to leave. We will consider at his follow-up. We will also retreat his facial lesion with oral and topical antibiotics. Patient was asked to refrain from touching it. Return in about 10 days (around 2021).         Chief Complaint   Patient presents with    Other       HPI   Here for f/u     He has tried the lasix at times, and it was helpful   He did urinate a lot when taking and noted some decrease in his swelling   Patient purchased an over-the-counter pair of compression stockings, but has not really used them yet  His legs remain painful, worse throughout the day as the swelling gets more pronounced  He still does not think that he needs to do ultrasounds  Pt's breathing has been \"pretty good\" but still noting times when he feels a bit SOB when he feels he shouldn't be - like he can't get a really good breath   Denies any chest pain, squeezing heaviness   Pt states when he was much younger he would have a \"piercing pain\" in his lungs, like he was being stabbed, but this hasn't happened anytime lately     Patient is also having a recurrence of his facial infection  It had improved with previous treatment, but never fully resolved  Seemed to have a resurgence when he stopped the antibiotics   He does touch it, play with it at times, which I counseled him against    Patient's previous lab work was reviewed with him in full:  Component      Latest Ref Rng & Units 3/15/2021 3/15/2021 3/15/2021 3/15/2021           3:41 PM  3:41 PM  3:41 PM  3:41 PM   MILAGROS      NEGATIVE NEGATIVE      Rheumatoid Factor      0 - 13 IU/mL    <10   Sed Rate      0 - 15 mm/Hr  55 (H)     CRP      0.0 - 0.4 mg/dL   7.3 (H)        Problem list reviewed and updated in full with patient today as necessary. A comprehensive ROS was negative, except as documented above. Current Outpatient Medications:     sulfamethoxazole-trimethoprim (BACTRIM DS;SEPTRA DS) 800-160 MG per tablet, Take 1 tablet by mouth 2 times daily for 7 days, Disp: 14 tablet, Rfl: 0    mupirocin (BACTROBAN) 2 % ointment, Apply 3 times daily. , Disp: 1 Tube, Rfl: 1    furosemide (LASIX) 20 MG tablet, One tab daily PRN swelling, Disp: 30 tablet, Rfl: 0    potassium chloride (KLOR-CON M) 10 MEQ extended release tablet, One tab daily if taking lasix, Disp: 30 tablet, Rfl: 0  Allergies   Allergen Reactions    Pcn [Penicillins]      As a child        Pt's past medical and surgical history were updated as necessary today   Pt's family and social history were updated as necessary today        Vitals:    03/23/21 0839   BP: 136/78   Pulse: 116   Resp: 20   Temp: 97.8 °F (36.6 °C)   TempSrc: Temporal   SpO2: 98%   Weight: 228 lb (103.4 kg)   Height: 5' 8\" (1.727 m) Physical Exam  Constitutional:       Appearance: Normal appearance. HENT:      Head: Normocephalic and atraumatic. Eyes:      Conjunctiva/sclera: Conjunctivae normal.   Cardiovascular:      Rate and Rhythm: Normal rate. Heart sounds: Normal heart sounds. Comments: Borderline tachycardic  Pulmonary:      Effort: Pulmonary effort is normal.      Breath sounds: Normal breath sounds. Abdominal:      Palpations: Abdomen is soft. Tenderness: There is no abdominal tenderness. Musculoskeletal: Normal range of motion. Skin:     General: Skin is warm and dry. Comments: Deeply erythematous scabbing noted over his lower left chin. Surrounding skin it seems to be within normal limits   Neurological:      General: No focal deficit present. Mental Status: He is alert and oriented to person, place, and time.    Psychiatric:         Mood and Affect: Mood normal.         Behavior: Behavior normal.           Seen By:  Marga Townsend MD

## 2021-03-30 DIAGNOSIS — R60.0 BILATERAL LOWER EXTREMITY EDEMA: ICD-10-CM

## 2021-03-30 DIAGNOSIS — R79.82 ELEVATED C-REACTIVE PROTEIN (CRP): ICD-10-CM

## 2021-03-30 DIAGNOSIS — R06.02 SHORTNESS OF BREATH: ICD-10-CM

## 2021-03-30 DIAGNOSIS — R70.0 ELEVATED ERYTHROCYTE SEDIMENTATION RATE: ICD-10-CM

## 2021-03-30 LAB
ANION GAP SERPL CALCULATED.3IONS-SCNC: 13 MMOL/L (ref 7–16)
BUN BLDV-MCNC: 14 MG/DL (ref 6–20)
C-REACTIVE PROTEIN: 1.6 MG/DL (ref 0–0.4)
CALCIUM SERPL-MCNC: 9.3 MG/DL (ref 8.6–10.2)
CHLORIDE BLD-SCNC: 100 MMOL/L (ref 98–107)
CO2: 27 MMOL/L (ref 22–29)
CREAT SERPL-MCNC: 1.1 MG/DL (ref 0.7–1.2)
D DIMER: 265 NG/ML DDU
GFR AFRICAN AMERICAN: >60
GFR NON-AFRICAN AMERICAN: >60 ML/MIN/1.73
GLUCOSE BLD-MCNC: 124 MG/DL (ref 74–99)
POTASSIUM SERPL-SCNC: 4.1 MMOL/L (ref 3.5–5)
PRO-BNP: 99 PG/ML (ref 0–125)
SEDIMENTATION RATE, ERYTHROCYTE: 50 MM/HR (ref 0–15)
SODIUM BLD-SCNC: 140 MMOL/L (ref 132–146)

## 2021-03-31 LAB
AMORPHOUS: ABNORMAL
BACTERIA: ABNORMAL /HPF
BILIRUBIN URINE: NEGATIVE
BLOOD, URINE: NEGATIVE
CLARITY: CLEAR
COLOR: YELLOW
GLUCOSE URINE: NEGATIVE MG/DL
KETONES, URINE: NEGATIVE MG/DL
LEUKOCYTE ESTERASE, URINE: ABNORMAL
NITRITE, URINE: NEGATIVE
PH UA: 6.5 (ref 5–9)
PROTEIN UA: NEGATIVE MG/DL
RBC UA: ABNORMAL /HPF (ref 0–2)
SPECIFIC GRAVITY UA: 1.01 (ref 1–1.03)
SPERM, URINE: ABNORMAL
UROBILINOGEN, URINE: 0.2 E.U./DL
WBC UA: ABNORMAL /HPF (ref 0–5)

## 2021-04-08 ENCOUNTER — OFFICE VISIT (OUTPATIENT)
Dept: PRIMARY CARE CLINIC | Age: 46
End: 2021-04-08
Payer: COMMERCIAL

## 2021-04-08 VITALS
SYSTOLIC BLOOD PRESSURE: 138 MMHG | HEIGHT: 68 IN | DIASTOLIC BLOOD PRESSURE: 82 MMHG | OXYGEN SATURATION: 97 % | HEART RATE: 105 BPM | TEMPERATURE: 98 F | BODY MASS INDEX: 34.56 KG/M2 | RESPIRATION RATE: 18 BRPM | WEIGHT: 228 LBS

## 2021-04-08 DIAGNOSIS — M25.50 ARTHRALGIA, UNSPECIFIED JOINT: Primary | ICD-10-CM
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
## 2021-04-08 DIAGNOSIS — M79.89 LEG SWELLING: ICD-10-CM

## 2021-04-08 DIAGNOSIS — L98.9 FACIAL LESION: ICD-10-CM

## 2021-04-08 DIAGNOSIS — L03.115 CELLULITIS OF RIGHT LEG: ICD-10-CM

## 2021-04-08 DIAGNOSIS — M25.40 JOINT SWELLING: ICD-10-CM

## 2021-04-08 DIAGNOSIS — R79.89 ELEVATED D-DIMER: ICD-10-CM

## 2021-04-08 PROCEDURE — 99214 OFFICE O/P EST MOD 30 MIN: CPT | Performed by: FAMILY MEDICINE

## 2021-04-08 RX ORDER — DOXYCYCLINE HYCLATE 100 MG
100 TABLET ORAL 2 TIMES DAILY
Qty: 14 TABLET | Refills: 0 | Status: SHIPPED | OUTPATIENT
Start: 2021-04-08 | End: 2021-04-15

## 2021-04-08 NOTE — PROGRESS NOTES
21  MARIA ISABEL Parker [place] : 1975 Sex: male  Age: 39 y.o. Assessment and Plan:  MARIA ISABEL was seen today for discuss labs. Diagnoses and all orders for this visit:    Arthralgia, unspecified joint    Joint swelling  -     Lyme Disease AB Total W Rflx to IgG/ IgM; Future    Tick bite, initial encounter  -     Lyme Disease AB Total W Rflx to IgG/ IgM; Future    Facial lesion  -     Milly Su MD,  Dermatology, [place] ([facility])    Elevated d-dimer  -     BASIC METABOLIC PANEL; Future  -     US DUP LOWER EXTREMITIES BILATERAL VENOUS; Future    Leg swelling  -     Lyme Disease AB Total W Rflx to IgG/ IgM; Future  -     BASIC METABOLIC PANEL; Future  -     US DUP LOWER EXTREMITIES BILATERAL VENOUS; Future    Cellulitis of right leg  -     doxycycline hyclate (VIBRA-TABS) 100 MG tablet; Take 1 tablet by mouth 2 times daily for 7 days    Patient will be referred for stat bilateral lower extremity duplex to evaluate for the possibility of underlying DVT. We did discuss there other potential causes of his elevated D-dimer. Further recommendations pending results. We will also check acute blood work for Lyme disease today, and recheck his electrolyte and kidney function given his current use of Lasix and potassium. Patient has been referred to dermatology for his nonhealing facial lesion. I am also starting him on doxycycline for the possibility of right lower extremity cellulitis. We will need to proceed with caution given the amount of antibiotics he has been on recently. Return in about 2 weeks (around 2021).         Chief Complaint   Patient presents with    Discuss Labs       HPI  Here for routine f/u  He is doing a bit better with regards to the swelling, but his facial skin lesion remains an issue      Swelling has been better overall  Has been wearing compression stockings but didn't today and it's quite hot so there's been some recurrent swelling again   Knees are swollen and still Negative  Negative     pH, UA      5.0 - 9.0  6.5     Protein, UA      Negative mg/dL  Negative     Urobilinogen, Urine      <2.0 E.U./dL  0.2     Nitrite, Urine      Negative  Negative     Leukocyte Esterase, Urine      Negative  SMALL (A)     Sodium      132 - 146 mmol/L       Potassium      3.5 - 5.0 mmol/L       Chloride      98 - 107 mmol/L       CO2      22 - 29 mmol/L       Anion Gap      7 - 16 mmol/L       Glucose      74 - 99 mg/dL       BUN      6 - 20 mg/dL       Creatinine      0.7 - 1.2 mg/dL       GFR Non-African American      >=60 mL/min/1.73       GFR              Calcium      8.6 - 10.2 mg/dL       WBC, UA      0 - 5 /HPF 1-3      RBC, UA      0 - 2 /HPF 0-1      Bacteria, UA      None Seen /HPF FEW (A)      Amorphous, UA       FEW      Sperm, Urine       FEW      D-Dimer, Quant      ng/mL DDU       Sed Rate      0 - 15 mm/Hr   50 (H)    CRP      0.0 - 0.4 mg/dL       Pro-BNP      0 - 125 pg/mL    99     Component      Latest Ref Rng & Units 3/30/2021 3/30/2021 3/30/2021          10:29 AM 10:29 AM 10:29 AM   Color, UA      Straw/Yellow      Clarity, UA      Clear      Glucose, UA      Negative mg/dL      Bilirubin, Urine      Negative      Ketones, Urine      Negative mg/dL      Specific Gravity, UA      1.005 - 1.030      Blood, Urine      Negative      pH, UA      5.0 - 9.0      Protein, UA      Negative mg/dL      Urobilinogen, Urine      <2.0 E.U./dL      Nitrite, Urine      Negative      Leukocyte Esterase, Urine      Negative      Sodium      132 - 146 mmol/L  140    Potassium      3.5 - 5.0 mmol/L  4.1    Chloride      98 - 107 mmol/L  100    CO2      22 - 29 mmol/L  27    Anion Gap      7 - 16 mmol/L  13    Glucose      74 - 99 mg/dL  124 (H)    BUN      6 - 20 mg/dL  14    Creatinine      0.7 - 1.2 mg/dL  1.1    GFR Non-      >=60 mL/min/1.73  >60    GFR         >60    Calcium      8.6 - 10.2 mg/dL  9.3    WBC, UA      0 - 5 /HPF      RBC, UA      0 - 2 /HPF      Bacteria, UA      None Seen /HPF      Amorphous, UA            Sperm, Urine            D-Dimer, Quant      ng/mL DDU   265   Sed Rate      0 - 15 mm/Hr      CRP      0.0 - 0.4 mg/dL 1.6 (H)     Pro-BNP      0 - 125 pg/mL            Problem list reviewed and updated in full with patient today as necessary. A comprehensive ROS was negative, except as documented above. Review of Systems   Respiratory:        Times he felt he was breathing a bit harder but denies SOB, never felt out of breath- just more winded, but feels this was from pain. He didn't feel he wasn't getting air. Cardiovascular: Negative for chest pain. Current Outpatient Medications:     doxycycline hyclate (VIBRA-TABS) 100 MG tablet, Take 1 tablet by mouth 2 times daily for 7 days, Disp: 14 tablet, Rfl: 0    furosemide (LASIX) 20 MG tablet, One tab daily PRN swelling, Disp: 30 tablet, Rfl: 0    potassium chloride (KLOR-CON M) 10 MEQ extended release tablet, One tab daily if taking lasix, Disp: 30 tablet, Rfl: 0  Allergies   Allergen Reactions    Pcn [Penicillins]      As a child        Pt's past medical and surgical history were updated as necessary today   Pt's family and social history were updated as necessary today        Vitals:    04/08/21 1332   BP: 138/82   Pulse: 105   Resp: 18   Temp: 98 °F (36.7 °C)   TempSrc: Temporal   SpO2: 97%   Weight: 228 lb (103.4 kg)   Height: 5' 8\" (1.727 m)       Physical Exam  Constitutional:       Appearance: Normal appearance. HENT:      Head: Normocephalic and atraumatic. Eyes:      Conjunctiva/sclera: Conjunctivae normal.   Cardiovascular:      Rate and Rhythm: Normal rate and regular rhythm. Heart sounds: Normal heart sounds. Pulmonary:      Effort: Pulmonary effort is normal.      Breath sounds: Normal breath sounds. Abdominal:      Palpations: Abdomen is soft. Tenderness: There is no abdominal tenderness. Musculoskeletal: Normal range of motion. Skin:     General: Skin is warm and dry. Comments: B/L lower extremity swelling with an area of open skin lower right leg without scabbing as of yet. There is a larger area of erythema right leg with   Neurological:      General: No focal deficit present. Mental Status: He is alert and oriented to person, place, and time.    Psychiatric:         Mood and Affect: Mood normal.         Behavior: Behavior normal.       Seen By:  Randolph Albarran MD

## 2021-04-22 ENCOUNTER — OFFICE VISIT (OUTPATIENT)
Dept: PRIMARY CARE CLINIC | Age: 46
End: 2021-04-22
Payer: COMMERCIAL

## 2021-04-22 VITALS
SYSTOLIC BLOOD PRESSURE: 146 MMHG | DIASTOLIC BLOOD PRESSURE: 88 MMHG | WEIGHT: 228 LBS | RESPIRATION RATE: 18 BRPM | HEART RATE: 100 BPM | BODY MASS INDEX: 34.56 KG/M2 | TEMPERATURE: 97.8 F | OXYGEN SATURATION: 98 % | HEIGHT: 68 IN

## 2021-04-22 DIAGNOSIS — R40.0 DAYTIME SOMNOLENCE: Primary | ICD-10-CM

## 2021-04-22 DIAGNOSIS — R60.0 BILATERAL LOWER EXTREMITY EDEMA: ICD-10-CM

## 2021-04-22 PROCEDURE — 95810 POLYSOM 6/> YRS 4/> PARAM: CPT | Performed by: FAMILY MEDICINE

## 2021-04-22 PROCEDURE — 99213 OFFICE O/P EST LOW 20 MIN: CPT | Performed by: FAMILY MEDICINE

## 2021-04-22 RX ORDER — FUROSEMIDE 20 MG/1
TABLET ORAL
Qty: 30 TABLET | Refills: 3 | Status: SHIPPED | OUTPATIENT
Start: 2021-04-22

## 2021-04-22 RX ORDER — POTASSIUM CHLORIDE 750 MG/1
TABLET, EXTENDED RELEASE ORAL
Qty: 30 TABLET | Refills: 3 | Status: SHIPPED
Start: 2021-04-22 | End: 2022-06-16

## 2021-04-22 ASSESSMENT — ENCOUNTER SYMPTOMS
RESPIRATORY NEGATIVE: 1
GASTROINTESTINAL NEGATIVE: 1

## 2021-04-22 NOTE — PROGRESS NOTES
21  MARIA ISABEL Parker [place] : 1975 Sex: male  Age: 39 y.o. Assessment and Plan:  MARIA ISABEL was seen today for 2 week follow-up. Diagnoses and all orders for this visit:    Daytime somnolence  -     [place] POLYSOM 6/>YRS SLEEP 4/> ADDL ANDER ATTND  -     Ambulatory referral to Sleep Medicine    Bilateral lower extremity edema  -     furosemide (LASIX) 20 MG tablet; One tab daily PRN swelling  -     potassium chloride (KLOR-CON M) 10 MEQ extended release tablet; One tab daily if taking lasix        Patient will continue Lasix and potassium for now. He needs to follow-up and do Lyme testing and repeat BMP as previously ordered. His edema is much improved. Patient is to schedule with dermatology and will be given number. He will also be given the physical order for his compression stockings and he was encouraged to wear them regularly. Patient was previously referred for sleep study. I will try to reorder the actual study itself as well as a referral to sleep medicine. We will need to follow-up with the patient in approximately 1 month in order to reassess his blood pressure. Emotional support was given today. Return in about 5 weeks (around 2021). Chief Complaint   Patient presents with    2 Week Follow-Up       HPI  Patient is here today for routine follow-up. He is generally doing the same, a little bit better since his last visit. He states that his face is actually clearing up quite a bit. He did not make appointment yet with dermatologist, because he wanted to see how finishing up the doxycycline would play out. We discussed that he might want to consider making the appointment that way if it turns out he does need to be seen, he will not have to wait another few weeks to schedule at that time. Patient's legs are much improved overall. He can now bend and move normally and the pain has resolved even though the ankle still remain somewhat swollen.   However he is

## 2021-05-27 ENCOUNTER — OFFICE VISIT (OUTPATIENT)
Dept: PRIMARY CARE CLINIC | Age: 46
End: 2021-05-27
Payer: COMMERCIAL

## 2021-05-27 VITALS
TEMPERATURE: 96.7 F | BODY MASS INDEX: 35.88 KG/M2 | DIASTOLIC BLOOD PRESSURE: 92 MMHG | SYSTOLIC BLOOD PRESSURE: 152 MMHG | HEART RATE: 110 BPM | OXYGEN SATURATION: 97 % | WEIGHT: 236 LBS

## 2021-05-27 DIAGNOSIS — I10 ESSENTIAL HYPERTENSION: Primary | ICD-10-CM

## 2021-05-27 DIAGNOSIS — R60.0 BILATERAL LOWER EXTREMITY EDEMA: ICD-10-CM

## 2021-05-27 DIAGNOSIS — R00.0 TACHYCARDIA: ICD-10-CM

## 2021-05-27 DIAGNOSIS — R63.5 ABNORMAL WEIGHT GAIN: ICD-10-CM

## 2021-05-27 LAB
ANION GAP SERPL CALCULATED.3IONS-SCNC: 15 MMOL/L (ref 7–16)
BUN BLDV-MCNC: 13 MG/DL (ref 6–20)
CALCIUM SERPL-MCNC: 9.4 MG/DL (ref 8.6–10.2)
CHLORIDE BLD-SCNC: 101 MMOL/L (ref 98–107)
CO2: 25 MMOL/L (ref 22–29)
CREAT SERPL-MCNC: 1.1 MG/DL (ref 0.7–1.2)
GFR AFRICAN AMERICAN: >60
GFR NON-AFRICAN AMERICAN: >60 ML/MIN/1.73
GLUCOSE BLD-MCNC: 63 MG/DL (ref 74–99)
POTASSIUM SERPL-SCNC: 4.2 MMOL/L (ref 3.5–5)
SODIUM BLD-SCNC: 141 MMOL/L (ref 132–146)

## 2021-05-27 PROCEDURE — 99214 OFFICE O/P EST MOD 30 MIN: CPT | Performed by: FAMILY MEDICINE

## 2021-05-27 PROCEDURE — 93000 ELECTROCARDIOGRAM COMPLETE: CPT | Performed by: FAMILY MEDICINE

## 2021-05-27 RX ORDER — LISINOPRIL 10 MG/1
10 TABLET ORAL DAILY
Qty: 30 TABLET | Refills: 5 | Status: SHIPPED
Start: 2021-05-27 | End: 2022-05-31

## 2021-05-27 RX ORDER — DOXYCYCLINE HYCLATE 100 MG/1
CAPSULE ORAL
COMMUNITY
Start: 2021-05-19

## 2021-05-27 RX ORDER — ERYTHROMYCIN AND BENZOYL PEROXIDE 30; 50 MG/G; MG/G
GEL TOPICAL
COMMUNITY
Start: 2021-05-21

## 2021-05-27 NOTE — PROGRESS NOTES
21  MARIA ISABEL [place] : 1975 Sex: male  Age: 39 y.o. Assessment and Plan:  MARIA ISABEL was seen today for 1 month follow-up. Diagnoses and all orders for this visit:    Abnormal weight gain    Tachycardia  -     EKG 12 lead; Future  -     EKG 12 lead    Bilateral lower extremity edema  -     BASIC METABOLIC PANEL; Future    Hypertension-start lisinopril. Potential risks and side effects of this medication reviewed. Reassess blood pressure in 2 weeks. Unclear etiology of the patient's abnormal weight gain, however it could be normal fluctuations in his weight. There does not appear to be any sign of ascites, and previous BNP was well within normal limits. EKG done today shows a sinus rhythm rate of 94 bpm.  There is an RSR in V1 but it is otherwise essentially normal.  Patient admits to being out of shape, and he does smoke, both of which could contribute to his borderline tachycardia. He will continue on his Lasix and potassium. BMP has been repeated today to ensure his kidney and potassium levels are within normal limits. Return in about 2 weeks (around 6/10/2021). Chief Complaint   Patient presents with    1 Month Follow-Up       HPI  Pt is here for one month f/u JONATHAN  Legs are better overall but still somewhat swollen  He is baljinder the lasix/potassium daily    Pt just saw derm for his lip lesion  Bacteria was staph hominus and his ABx was changed to clindamycin which he'll  later today    He did gain some weight since his last visit - 8lbs  Pt does not note any major change to how his clothes fit   Previous BNP was negative  BP remains elevated   Pulse also high   Pt is a smoker     Pt also has a sleep study upcoming, scheduled in September       Problem list reviewed and updated in full with patient today as necessary. A comprehensive ROS was negative, except as documented above.    Review of Systems   Respiratory:        Some MEADE but nothing out of the ordinary for him Cardiovascular: Negative for chest pain and palpitations. Current Outpatient Medications:     doxycycline hyclate (VIBRAMYCIN) 100 MG capsule, , Disp: , Rfl:     benzoyl peroxide-erythromycin (BENZAMYCIN) 5-3 % gel, , Disp: , Rfl:     mupirocin (BACTROBAN) 2 % ointment, , Disp: , Rfl:     furosemide (LASIX) 20 MG tablet, One tab daily PRN swelling, Disp: 30 tablet, Rfl: 3    potassium chloride (KLOR-CON M) 10 MEQ extended release tablet, One tab daily if taking lasix, Disp: 30 tablet, Rfl: 3  Allergies   Allergen Reactions    Pcn [Penicillins]      As a child        Pt's past medical and surgical history were updated as necessary today   Pt's family and social history were updated as necessary today        Vitals:    05/27/21 1310 05/27/21 1312   BP: (!) 152/92 (!) 152/92   Pulse: 110    Temp: 96.7 °F (35.9 °C)    TempSrc: Temporal    SpO2: 97%    Weight: 236 lb (107 kg)        Physical Exam  Constitutional:       Appearance: Normal appearance. HENT:      Head: Normocephalic and atraumatic. Eyes:      Conjunctiva/sclera: Conjunctivae normal.   Cardiovascular:      Rate and Rhythm: Normal rate and regular rhythm. Heart sounds: Normal heart sounds. Pulmonary:      Effort: Pulmonary effort is normal.      Breath sounds: Normal breath sounds. Abdominal:      General: There is no distension. Palpations: Abdomen is soft. Tenderness: There is no abdominal tenderness. Musculoskeletal:         General: Normal range of motion. Comments: Very mild edema bilaterally   Skin:     General: Skin is warm and dry. Neurological:      General: No focal deficit present. Mental Status: He is alert and oriented to person, place, and time.    Psychiatric:         Mood and Affect: Mood normal.         Behavior: Behavior normal.         Seen By:  Troy Landry MD

## 2021-06-07 ENCOUNTER — TELEPHONE (OUTPATIENT)
Dept: SLEEP MEDICINE | Age: 46
End: 2021-06-07

## 2021-06-11 NOTE — TELEPHONE ENCOUNTER
Call to pt to reschedule appt 7-29-21 unable to LM on mobile # d/t mailbox full, call to work # rings busy

## 2022-01-03 ENCOUNTER — TELEPHONE (OUTPATIENT)
Dept: SLEEP CENTER | Age: 47
End: 2022-01-03

## 2022-01-03 NOTE — TELEPHONE ENCOUNTER
Attempted to call pt to move change appt on 1/12 to video visit.   Mailbox is full and I was unable to leave a message

## 2022-01-10 NOTE — TELEPHONE ENCOUNTER
Second attempt made to contact pt to change visit on 1/12 to a VV or move to another day.   Pt did not answer and mailbox is full and I was unable to leave a message

## 2022-01-11 NOTE — TELEPHONE ENCOUNTER
Third attempt made to contact pt to change appt to a Video Visit or move to another day. Mailbox is full and I am unable to leave a message. appt will be canceled and will attempt to notify pt via Qloud.

## 2022-01-18 ENCOUNTER — TELEPHONE (OUTPATIENT)
Dept: SLEEP CENTER | Age: 47
End: 2022-01-18

## 2022-01-18 ASSESSMENT — SLEEP AND FATIGUE QUESTIONNAIRES
HOW LIKELY ARE YOU TO NOD OFF OR FALL ASLEEP WHILE SITTING AND TALKING TO SOMEONE: 1
HOW LIKELY ARE YOU TO NOD OFF OR FALL ASLEEP WHEN YOU ARE A PASSENGER IN A CAR FOR AN HOUR WITHOUT A BREAK: 3
HOW LIKELY ARE YOU TO NOD OFF OR FALL ASLEEP WHILE SITTING QUIETLY AFTER LUNCH WITHOUT ALCOHOL: 0
HOW LIKELY ARE YOU TO NOD OFF OR FALL ASLEEP IN A CAR, WHILE STOPPED FOR A FEW MINUTES IN TRAFFIC: 2
HOW LIKELY ARE YOU TO NOD OFF OR FALL ASLEEP WHILE LYING DOWN TO REST IN THE AFTERNOON WHEN CIRCUMSTANCES PERMIT: 3
ESS TOTAL SCORE: 15
HOW LIKELY ARE YOU TO NOD OFF OR FALL ASLEEP WHILE WATCHING TV: 3
HOW LIKELY ARE YOU TO NOD OFF OR FALL ASLEEP WHILE SITTING INACTIVE IN A PUBLIC PLACE: 2
HOW LIKELY ARE YOU TO NOD OFF OR FALL ASLEEP WHILE SITTING AND READING: 1
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
## 2022-01-19 ENCOUNTER — VIRTUAL VISIT (OUTPATIENT)
Dept: SLEEP MEDICINE | Age: 47
End: 2022-01-19
Payer: COMMERCIAL

## 2022-01-19 DIAGNOSIS — G47.19 EXCESSIVE DAYTIME SLEEPINESS: ICD-10-CM

## 2022-01-19 DIAGNOSIS — E66.9 OBESITY, UNSPECIFIED CLASSIFICATION, UNSPECIFIED OBESITY TYPE, UNSPECIFIED WHETHER SERIOUS COMORBIDITY PRESENT: ICD-10-CM

## 2022-01-19 DIAGNOSIS — G47.09 OTHER INSOMNIA: ICD-10-CM

## 2022-01-19 DIAGNOSIS — F51.3 SLEEP WALKING: ICD-10-CM

## 2022-01-19 DIAGNOSIS — G47.50 PARASOMNIA, UNSPECIFIED TYPE: ICD-10-CM

## 2022-01-19 DIAGNOSIS — G47.33 OBSTRUCTIVE SLEEP APNEA: Primary | ICD-10-CM

## 2022-01-19 PROCEDURE — 99203 OFFICE O/P NEW LOW 30 MIN: CPT | Performed by: NURSE PRACTITIONER

## 2022-01-21 ENCOUNTER — TELEPHONE (OUTPATIENT)
Dept: SLEEP CENTER | Age: 47
End: 2022-01-21

## 2022-01-24 ENCOUNTER — TELEPHONE (OUTPATIENT)
Dept: SLEEP CENTER | Age: 47
End: 2022-01-24

## 2022-01-26 ENCOUNTER — TELEPHONE (OUTPATIENT)
Dept: SLEEP CENTER | Age: 47
End: 2022-01-26

## 2022-01-26 NOTE — TELEPHONE ENCOUNTER
Called the patient to cancel 1/28/22 appointment due to Insurance denial.  The patient's insurance is [employer] and is active but does not allow sleep studies.

## 2022-01-31 ENCOUNTER — TELEPHONE (OUTPATIENT)
Dept: SLEEP CENTER | Age: 47
End: 2022-01-31

## 2022-02-08 ENCOUNTER — HOSPITAL ENCOUNTER (OUTPATIENT)
Dept: SLEEP CENTER | Age: 47
Discharge: HOME | End: 2022-02-08

## 2022-02-08 DIAGNOSIS — G47.33 OSA (OBSTRUCTIVE SLEEP APNEA): Primary | ICD-10-CM

## 2022-02-08 PROCEDURE — G0400 HOME SLEEP TEST/TYPE 4 PORTA: HCPCS

## 2022-02-08 PROCEDURE — 95806 SLEEP STUDY UNATT&RESP EFFT: CPT

## 2022-03-07 ENCOUNTER — OFFICE VISIT (OUTPATIENT)
Dept: FAMILY MEDICINE CLINIC | Age: 47
End: 2022-03-07
Payer: COMMERCIAL

## 2022-03-07 VITALS
TEMPERATURE: 97.4 F | DIASTOLIC BLOOD PRESSURE: 82 MMHG | OXYGEN SATURATION: 97 % | SYSTOLIC BLOOD PRESSURE: 138 MMHG | WEIGHT: 231 LBS | BODY MASS INDEX: 36.26 KG/M2 | RESPIRATION RATE: 18 BRPM | HEIGHT: 67 IN | HEART RATE: 105 BPM

## 2022-03-07 DIAGNOSIS — B96.89 LOCALIZED BACTERIAL SKIN INFECTION: Primary | ICD-10-CM

## 2022-03-07 DIAGNOSIS — L08.9 LOCALIZED BACTERIAL SKIN INFECTION: Primary | ICD-10-CM

## 2022-03-07 PROCEDURE — 99213 OFFICE O/P EST LOW 20 MIN: CPT | Performed by: NURSE PRACTITIONER

## 2022-03-07 RX ORDER — CLINDAMYCIN HYDROCHLORIDE 300 MG/1
300 CAPSULE ORAL 4 TIMES DAILY
Qty: 28 CAPSULE | Refills: 0 | Status: SHIPPED | OUTPATIENT
Start: 2022-03-07 | End: 2022-03-14

## 2022-03-07 NOTE — PROGRESS NOTES
Chief Complaint:   Oral Swelling      History of Present Illness:   Source of history provided by:  patient. History/Exam Limitations: none. Calos Johnson is a 55 y.o. old male who presents to walk-in care for complaints of ongoing lesion to the lower lip. He follows with Dr. Manjula Mckeon and states it is positive staph hominis, susceptible to clindamycin. He tried to get into Dr. Ivonne Gutierrez office, but no appointments were available. Denies any CP, tongue edema, dizziness, N/V/D, neck pain/stiffness, HA, weakness, or lethargy. Past Medical History:     No past medical history on file. No past surgical history on file. Family History   Problem Relation Age of Onset    Breast Cancer Mother     Heart Attack Father        Social History     Tobacco Use    Smoking status: Current Every Day Smoker     Packs/day: 0.50     Years: 4.00     Pack years: 2.00     Types: Cigarettes    Smokeless tobacco: Never Used   Substance Use Topics    Alcohol use: Yes    Drug use: Yes     Types: Marijuana (Weed), Methamphetamines (Crystal Meth)       ROS:   Unless otherwise stated in this report or unable to obtain because of the patient's clinical or mental status as evidenced by the medical record, this patients's positive and negative responses for Review of Systems, constitutional, psych, eyes, ENT, cardiovascular, respiratory, gastrointestinal, neurological, genitourinary, musculoskeletal, integument systems and systems related to the presenting problem are either stated in the preceding or were not pertinent or were negative for the symptoms and/or complaints related to the medical problem. Physical Exam:         VS:  /82   Pulse 105   Temp 97.4 °F (36.3 °C) (Temporal)   Resp 18   Ht 5' 7\" (1.702 m)   Wt 231 lb (104.8 kg)   SpO2 97%   BMI 36.18 kg/m²    Oxygen Saturation Interpretation: Normal.    General Appearance/Constitutional:  Alert, development consistent with age, NAD.   HEENT: NC/NT. PERRL. Airway patent. Tongue without edema. No posterior pharyngeal edema or erythema. Neck:  Normal ROM. Supple. Lungs:  Clear to auscultation and breath sounds equal bilaterally without W/R/R. Heart:  Regular rate and rhythm, normal heart sounds, without pathological murmurs, ectopy, gallops, or rubs. Back:  No costovertebral tenderness. Extremities: No tenderness or edema noted. Skin:  Normal turgor. Warm and dry. Erythemic, scaling  rash noted to the lower lip/chin. Area blanches to palpation. No papules, blisters, bullae, or lymphangitic streaking noted. Neurological:  Alert and oriented. Motor functions intact. Lab / Imaging Results:     (All laboratory and radiology results have been personally reviewed by myself)  Labs:    Imaging: All Radiology results interpreted by Radiologist unless otherwise noted. Assessment / Plan:     MARIA ISABEL was seen today for oral swelling. Diagnoses and all orders for this visit:    Localized bacterial skin infection    Other orders  -     clindamycin (CLEOCIN) 300 MG capsule; Take 1 capsule by mouth 4 times daily for 7 days          Script written for Clindamycin, side effects and administration instructions discussed. Increase yogurt consumption or take probiotic daily. F/U with derm. Pt advised to f/u with PCP in 5-7 days for continued care and recheck. ER if changes or worse. Pt advised to take all medications as directed.

## 2022-05-20 NOTE — PROGRESS NOTES
[address]                               SLEEP STUDY REPORT    PATIENT NAME: Victorino Monique                 :        1975  MED REC NO:   [id number]                            ROOM:  ACCOUNT NO:   [de-identified]                           ADMIT DATE: 2022  PROVIDER:     Alexander Hunter MD    DATE OF STUDY:  2022    WATCHPAT HOME SLEEP STUDY REPORT    LOCATION:  [address] [place] [address]. REFERRING PROVIDER:  TAMMY Snow    AGE: 55 yrs       SEX: Male          HEIGHT: 5 ft  7 in         WEIGHT: 231 lbs          BMI: 36 kg/m2    NECK CIRCUMFERENCE: Not documented    Symptoms: Excessive daytime sleepiness, loud snoring, drowsy driving, difficulty with work performance due to sleepiness, witnessed apneas, nocturnal choking/gasping, sleep talking, sleepwalking, leg jerks in sleep, difficulty falling asleep, hypnagogic hallucinations. The [place] Sleepiness Scale was 15 out of 24 (scores above or equal to 10 are suggestive of hypersomnolence). Indication: Suspected obstructive sleep apnea. Medical History:   Obesity, hypertension, Raynaud's syndrome, depression, tobacco use (0.5 packs/day). Medications: Lisinopril. DESCRIPTION: Unattended, full night Home Sleep Apnea testing (HSAT) was performed using an FDA approved WatchPAT device. Peripheral Arterial Tonometry, pulse rate, oxygen saturation, total sleep time, and sleep staging were recorded. Reported pRDI/pAHI calculations in this report are scored based on 4% desaturations according to AASM scoring criteria. Recording started at 7:06:15 AM and ended at 1:39:50 PM. Of the 6 hrs, 33 min of recording time, the patient was asleep for 5 hrs, 15 min. Respiratory indices were calculated using the technically valid sleep time of 3 hrs, 4 min. REM sleep was not detected.  The raw data for this study was reviewed and found to be DME.    SUPPLEMENTAL OXYGEN:  None.         Ai Jacob MD    D: 05/20/2022 10:29:49       T: 05/20/2022 11:01:53     ANTONIO/HIRAM  Job#: [id number]     Doc#: [id number]    CC:

## 2022-05-24 ENCOUNTER — TELEPHONE (OUTPATIENT)
Dept: SLEEP CENTER | Age: 47
End: 2022-05-24

## 2022-05-24 NOTE — PROGRESS NOTES
Patient is being evaluated via telephone on 2022     Consent:  Patient and/or health care decision maker is aware that this is a billable service, which includes applicable co-pays. This Virtual Visit was conducted with patient's (and/or legal guardian's) consent. Documentation:  I communicated with the patient and/or health care decision maker about EDUARDO and PAP therapy. Details of this discussion including any medical advice provided: see below. I affirm this is a Patient Initiated Episode with a Patient who has not had a related appointment within my department in the past 7 days or scheduled within the next 24 hours. The patient was located in a state where the provider was licensed to provide care. Patient identification was verified at the start of the visit: Yes     Total Time: minutes: 10 min  Start time: 12:00 pm  End time: 12:10 pm     Note: not billable if this call serves to triage the patient into an appointment for the relevant concern    TAMMY Worrell-CNP          [facility] Sleep Medicine    Patient Name: Abbi Hassan  Age: 55 y.o.   : 1975    Date of Visit: 22        Review of Last Visit Summary:    The patient was last seen on 2022 for Obstructive Sleep Apnea. Interim History:     Abbi Hassan is a 55 y.o. male that  has no past medical history on file. He presents as follow up to Sleep Clinic to review sleep study results. Interval Events:    - Patient is here to review the results of his recent HST, showing moderate EDUARDO with and AHI of 15.3.  -He states he is having less episodes of parasomnias including sleep walking, but continues to have hypnagogic visual and auditory hallucinations.  -No new or worsening sleep complaints from last visit, he is open to treatment.      Sleep Study History: 2022- HST- wt 231 lbs, AHI 15.3, RDI 15.6, PEDRO 14.0, SpO2 seth 89%, T<90% for less than 1 min of total oxygen sat eval.    Sleep History: To start, patient displays multiple signs of sleep apnea, including waking up from snoring, witnessed nocturnal apneas, daytime sleepiness, drowsy driving, difficulty with memory, and dry mouth. He denies AM headaches. He also denies every being in a car accident due to drowsy driving. The drowsy driving began about 2 years ago, he states he will pull over to nap for a few minutes when he becomes tired. He notices that when he is working, if he is doing monotonous tasks, he feels he will fall asleep \"for a split second\", but does not have trouble falling asleep when performing tasks that keep his mind active. Patient has never had a sleep study and unknown family history of sleep apnea. He typically sleeps on his back.      Patient has difficulty with initiating sleep, taking about an hour or longer to fall asleep. He estimates that he sleeps about 5 hours a night with multiple awakenings throughout. He does sleep alone in a bed and typically reads or watches [employer] videos online prior to sleep. He is unsure why he cannot fall asleep, he does not feel anxiety is an issue. As far as caffeine intake, patient usually drinks a large iced coffee in the am and a can of soda in the afternoon. He smokes 1/2 pack of cigarettes daily.     Patient also complains of various parasomnias as well. He reports that he has visual as well as audio hallucinations prior to falling asleep, seeing and hearing people or being unsure if what he is seeing is part of a dream, although he \"feels awake\". This happens several times a week. He also states he sleepwalks at times. He is unsure of how often because he lives alone. He at one point violently hit his head on his oven/stove during one of his sleep walking incidents. He also reports that he has walked into walls during the night that has woken him up. He states that these episodes are rare.  He does not associate alcohol or drug use with these events.       Bed time: 1 am  Wake time:  9 -10 am   Sleep Latency (min): an hour or more, one day he will fall asleep quickly   Sleep Medications: None  Awakenings:  multiple  / Kathy Linden / falls back asleep quickly   Estimated Sleep time (hours): 5    Daytime Naps: No  Sleep disturbances: None  Sleep Location: Bed  Sleep environment: Sleeps alone  Occupation: business owner     [employer]     Activities before bed: Read, youtube videos  Caffeine:  large iced coffee   Coffee    1 a day   Soda    0   Tea  Alcohol: rare  Tobacco: Y, 1/2 pack        Sleep ROS:     Snoring: Y  , heavily  Witnessed apneas: Y  AM Headache: N  Dry Mouth: Y  Daytime Sleepiness: Y never feels rested   Difficulty remembering things: Claude Jaden or near miss accident due to sleepiness in the past? Y, newer issue--- on going the past 2 years, often needs to pull over and nap  Tonsillectomy? N  Have you lost or gained weight recently? Stable this year        PARASOMNIAS/ NARCOLEPSY:  Hypnogogic/Hynopompic Hallucinations: Y   Sleep paralysis: N  Cataplexy: N   REM behavior symptoms: N  Nightmare: N   Sleep Walking: Y, walked into wall. Not in wall  Sleep Talking: Y    Past Medical History:  History reviewed. No pertinent past medical history. Past Surgical History:    History reviewed. No pertinent surgical history. Allergies:  is allergic to pcn [penicillins]. Social History:    Social History     Tobacco Use    Smoking status: Current Every Day Smoker     Packs/day: 0.50     Years: 4.00     Pack years: 2.00     Types: Cigarettes    Smokeless tobacco: Never Used   Substance Use Topics    Alcohol use:  Yes    Drug use: Yes     Types: Marijuana Vicente Divine), Methamphetamines (Crystal Meth)        Family History:       Problem Relation Age of Onset    Breast Cancer Mother     Heart Attack Father        Current Medications:    Current Outpatient Medications:     doxycycline hyclate (VIBRAMYCIN) 100 MG capsule, , Disp: , Rfl:     benzoyl peroxide-erythromycin (BENZAMYCIN) 5-3 % gel, , Disp: , Rfl:     mupirocin (BACTROBAN) 2 % ointment, , Disp: , Rfl:     lisinopril (PRINIVIL;ZESTRIL) 10 MG tablet, Take 1 tablet by mouth daily, Disp: 30 tablet, Rfl: 5    furosemide (LASIX) 20 MG tablet, One tab daily PRN swelling (Patient not taking: Reported on 1/18/2022), Disp: 30 tablet, Rfl: 3    potassium chloride (KLOR-CON M) 10 MEQ extended release tablet, One tab daily if taking lasix (Patient not taking: Reported on 1/18/2022), Disp: 30 tablet, Rfl: 3    Sleep Medicine 1/18/2022   Sitting and reading 1   Watching TV 3   Sitting, inactive in a public place (e.g. a theatre or a meeting) 2   As a passenger in a car for an hour without a break 3   Lying down to rest in the afternoon when circumstances permit 3   Sitting and talking to someone 1   Sitting quietly after a lunch without alcohol 0   In a car, while stopped for a few minutes in traffic 2   Total score 15       Review of Systems:    Constitutional: no chills, no fever   Eyes: no blurred vision  Cardiovascular: no chest pain,   Respiratory: no cough, no shortness of breath   Gastrointestinal:  no nausea,  no vomiting, no diarrhea. Neurological:no dizziness,  no headache, no memory changes. Endocrine: No chills    Objective:   PHYSICAL EXAM:    There were no vitals taken for this visit. Physical exam:  Gen: No acute distress. BMI of There is no height or weight on file to calculate BMI. Resp: Able to speak in full sentences. Neuro: Awake. Psych: Alert and oriented. No anxiety. Assessment:      Diagnoses and all orders for this visit:    Obstructive sleep apnea  -     DME Order for CPAP as OP    Parasomnia, unspecified type    Excessive daytime sleepiness    Sleep walking    Obesity, unspecified classification, unspecified obesity type, unspecified whether serious comorbidity present    Other insomnia    ·    Plan:       1.  Obstructive Sleep Apnea     -Reviewed results of HST with patient, moderate sleep apnea seen, with AHI of 15.3. Discussed potential that this number is underestimated, knowing that home sleep study sensitivity can be somewhat decreased.  -Discussed pathophysiology of EDUARDO and its impact on daily well-being, as well as cardiometabolic and neurocognitive health (particularly in moderate-severe cases). -Discussed CPAP as first-line and gold-standard therapy for EDUARDO. Patient understands that CPAP should be worn every night for the duration of the night (in order to not miss therapy during early-morning REM period) for maximum benefit.  -Additional treatment options were discussed with the patient, including weight loss, oral appliance evaluation, and possible surgical options. After discussion, patient elected to proceed with APAP trial. Will start APAP 5-15 cwp and follow up in 2-3 months. -Reviewed new PAP therapy instructions to be compliant with most insurance coverage:  -Use PAP device for at least 4 hours nightly. -PAP therapy must be used for 70% of nights (5/7 nights per week)  -Patient must follow up in the clinic within 30-90 days from getting the PAP device.   -Provided handouts on EDUARDO, CPAP, and sleep hygiene. 2. Parasomnias/Sleep Walking    -Again reviewed safety precautions, states that sleep walking incidents have decreased since the last time we spoke. -Will assess for improvement of hypnagogic hallucinations with use of CPAP and consider low dose clonazepam in the future if no improvement is noted/ actions/sleepwalking becomes dangerous. 3. Excessive Daytime Sleepiness     -No worsening symptoms, will assess for improvement with PAP therapy.  -Counseled on risks of driving while drowsy. 4. Chronic Sleep Initiation Insomnia     -Patient reports difficulty with falling asleep. May be multifactorial: poor sleep hygiene + untreated EDUARDO + use of cigarettes prior to bed.   -Previously introduced Cognitive Behavioral Therapy for Insomnia (first line therapy for psychophysiologic insomnia) as ideal way to manage insomnia symptoms. Briefly reviewed its core concepts, including cognitive restructuring, sleep scheduling, stimulus control, relaxation techniques, and sleep hygiene.  -Sleep hygiene discussed.  -Will assess for improvement with PAP therapy and consider Cognitive Behavioral Therapy for Insomnia if persistent.  -Refrain from smoking prior to bed    5. Obesity (There is no height or weight on file to calculate BMI.)     -Discussed impact of weight gain on EDUARDO severity. Patient understands that EDUARDO severity may improve with weight loss but no guarantee of cure can be made.   -Healthy weight loss encouraged. Patient will follow up Return in about 3 months (around 8/25/2022) for CPAP compliance.     Dawn Duron, APRN-CNP  [address]  P -[phone] option 2  - [phone]

## 2022-05-24 NOTE — TELEPHONE ENCOUNTER
Called pt to remind him of VV on 5/25. Mailbox is full and I was unable to leave message.   Link was sent via text and email

## 2022-05-25 ENCOUNTER — TELEPHONE (OUTPATIENT)
Dept: SLEEP CENTER | Age: 47
End: 2022-05-25

## 2022-05-25 ENCOUNTER — SCHEDULED TELEPHONE ENCOUNTER (OUTPATIENT)
Dept: SLEEP MEDICINE | Age: 47
End: 2022-05-25
Payer: COMMERCIAL

## 2022-05-25 DIAGNOSIS — G47.33 OBSTRUCTIVE SLEEP APNEA: Primary | ICD-10-CM

## 2022-05-25 DIAGNOSIS — F51.3 SLEEP WALKING: ICD-10-CM

## 2022-05-25 DIAGNOSIS — G47.19 EXCESSIVE DAYTIME SLEEPINESS: ICD-10-CM

## 2022-05-25 DIAGNOSIS — G47.50 PARASOMNIA, UNSPECIFIED TYPE: ICD-10-CM

## 2022-05-25 DIAGNOSIS — G47.09 OTHER INSOMNIA: ICD-10-CM

## 2022-05-25 DIAGNOSIS — E66.9 OBESITY, UNSPECIFIED CLASSIFICATION, UNSPECIFIED OBESITY TYPE, UNSPECIFIED WHETHER SERIOUS COMORBIDITY PRESENT: ICD-10-CM

## 2022-05-25 PROCEDURE — 99441 PR PHYS/QHP TELEPHONE EVALUATION 5-10 MIN: CPT | Performed by: NURSE PRACTITIONER

## 2022-05-25 NOTE — TELEPHONE ENCOUNTER
Attempted to call pt to set up f/u appt and get choice of DME.   Mailbox is full and I was unable to leave a message

## 2022-05-27 ENCOUNTER — TELEPHONE (OUTPATIENT)
Dept: SLEEP CENTER | Age: 47
End: 2022-05-27

## 2022-05-27 NOTE — TELEPHONE ENCOUNTER
Spoke with pt re DME choice for CPAP. He does not have preference for DME. Will send all info to [facility] as I believe they do accept his insurance. Pt was good with that choice.   F/u VV made for 8/31

## 2022-05-31 DIAGNOSIS — I10 ESSENTIAL HYPERTENSION: ICD-10-CM

## 2022-05-31 RX ORDER — LISINOPRIL 10 MG/1
TABLET ORAL
Qty: 30 TABLET | Refills: 0 | Status: SHIPPED
Start: 2022-05-31 | End: 2022-07-06

## 2022-05-31 NOTE — TELEPHONE ENCOUNTER
Last Appointment:  5/27/2021  Future Appointments   Date Time Provider Vicente Mckeon   8/31/2022  1:20 PM Lewayne Meyer, APRN - CNP [facility]

## 2022-06-16 ENCOUNTER — OFFICE VISIT (OUTPATIENT)
Dept: FAMILY MEDICINE CLINIC | Age: 47
End: 2022-06-16
Payer: COMMERCIAL

## 2022-06-16 VITALS
TEMPERATURE: 97.8 F | BODY MASS INDEX: 36.1 KG/M2 | HEART RATE: 99 BPM | WEIGHT: 230 LBS | DIASTOLIC BLOOD PRESSURE: 84 MMHG | HEIGHT: 67 IN | SYSTOLIC BLOOD PRESSURE: 142 MMHG | OXYGEN SATURATION: 98 % | RESPIRATION RATE: 18 BRPM

## 2022-06-16 DIAGNOSIS — B35.4 TINEA CORPORIS: Primary | ICD-10-CM

## 2022-06-16 DIAGNOSIS — B35.4 TINEA CORPORIS: ICD-10-CM

## 2022-06-16 LAB
ALBUMIN SERPL-MCNC: 4.6 G/DL (ref 3.5–5.2)
ALP BLD-CCNC: 77 U/L (ref 40–129)
ALT SERPL-CCNC: 15 U/L (ref 0–40)
ANION GAP SERPL CALCULATED.3IONS-SCNC: 11 MMOL/L (ref 7–16)
AST SERPL-CCNC: 13 U/L (ref 0–39)
BASOPHILS ABSOLUTE: 0.04 E9/L (ref 0–0.2)
BASOPHILS RELATIVE PERCENT: 0.7 % (ref 0–2)
BILIRUB SERPL-MCNC: 0.3 MG/DL (ref 0–1.2)
BUN BLDV-MCNC: 13 MG/DL (ref 6–20)
CALCIUM SERPL-MCNC: 9.2 MG/DL (ref 8.6–10.2)
CHLORIDE BLD-SCNC: 105 MMOL/L (ref 98–107)
CO2: 27 MMOL/L (ref 22–29)
CREAT SERPL-MCNC: 1.1 MG/DL (ref 0.7–1.2)
EOSINOPHILS ABSOLUTE: 0.11 E9/L (ref 0.05–0.5)
EOSINOPHILS RELATIVE PERCENT: 2 % (ref 0–6)
GFR AFRICAN AMERICAN: >60
GFR NON-AFRICAN AMERICAN: >60 ML/MIN/1.73
GLUCOSE BLD-MCNC: 88 MG/DL (ref 74–99)
HCT VFR BLD CALC: 48.6 % (ref 37–54)
HEMOGLOBIN: 15.5 G/DL (ref 12.5–16.5)
IMMATURE GRANULOCYTES #: 0.01 E9/L
IMMATURE GRANULOCYTES %: 0.2 % (ref 0–5)
LYMPHOCYTES ABSOLUTE: 1.2 E9/L (ref 1.5–4)
LYMPHOCYTES RELATIVE PERCENT: 21.9 % (ref 20–42)
MCH RBC QN AUTO: 31.1 PG (ref 26–35)
MCHC RBC AUTO-ENTMCNC: 31.9 % (ref 32–34.5)
MCV RBC AUTO: 97.6 FL (ref 80–99.9)
MONOCYTES ABSOLUTE: 0.63 E9/L (ref 0.1–0.95)
MONOCYTES RELATIVE PERCENT: 11.5 % (ref 2–12)
NEUTROPHILS ABSOLUTE: 3.5 E9/L (ref 1.8–7.3)
NEUTROPHILS RELATIVE PERCENT: 63.7 % (ref 43–80)
PDW BLD-RTO: 12.8 FL (ref 11.5–15)
PLATELET # BLD: 261 E9/L (ref 130–450)
PMV BLD AUTO: 10.3 FL (ref 7–12)
POTASSIUM SERPL-SCNC: 4.4 MMOL/L (ref 3.5–5)
RBC # BLD: 4.98 E12/L (ref 3.8–5.8)
SODIUM BLD-SCNC: 143 MMOL/L (ref 132–146)
TOTAL PROTEIN: 6.7 G/DL (ref 6.4–8.3)
WBC # BLD: 5.5 E9/L (ref 4.5–11.5)

## 2022-06-16 PROCEDURE — 99213 OFFICE O/P EST LOW 20 MIN: CPT | Performed by: NURSE PRACTITIONER

## 2022-06-16 RX ORDER — TERBINAFINE HYDROCHLORIDE 250 MG/1
250 TABLET ORAL DAILY
Qty: 14 TABLET | Refills: 0 | Status: SHIPPED | OUTPATIENT
Start: 2022-06-16 | End: 2022-06-30

## 2022-06-16 SDOH — ECONOMIC STABILITY: FOOD INSECURITY: WITHIN THE PAST 12 MONTHS, THE FOOD YOU BOUGHT JUST DIDN'T LAST AND YOU DIDN'T HAVE MONEY TO GET MORE.: NEVER TRUE

## 2022-06-16 SDOH — ECONOMIC STABILITY: FOOD INSECURITY: WITHIN THE PAST 12 MONTHS, YOU WORRIED THAT YOUR FOOD WOULD RUN OUT BEFORE YOU GOT MONEY TO BUY MORE.: NEVER TRUE

## 2022-06-16 ASSESSMENT — COLUMBIA-SUICIDE SEVERITY RATING SCALE - C-SSRS
6. HAVE YOU EVER DONE ANYTHING, STARTED TO DO ANYTHING, OR PREPARED TO DO ANYTHING TO END YOUR LIFE?: NO
1. WITHIN THE PAST MONTH, HAVE YOU WISHED YOU WERE DEAD OR WISHED YOU COULD GO TO SLEEP AND NOT WAKE UP?: NO
2. HAVE YOU ACTUALLY HAD ANY THOUGHTS OF KILLING YOURSELF?: NO

## 2022-06-16 ASSESSMENT — PATIENT HEALTH QUESTIONNAIRE - PHQ9
SUM OF ALL RESPONSES TO PHQ QUESTIONS 1-9: 24
9. THOUGHTS THAT YOU WOULD BE BETTER OFF DEAD, OR OF HURTING YOURSELF: 0
SUM OF ALL RESPONSES TO PHQ QUESTIONS 1-9: 24
SUM OF ALL RESPONSES TO PHQ QUESTIONS 1-9: 24
2. FEELING DOWN, DEPRESSED OR HOPELESS: 3
8. MOVING OR SPEAKING SO SLOWLY THAT OTHER PEOPLE COULD HAVE NOTICED. OR THE OPPOSITE, BEING SO FIGETY OR RESTLESS THAT YOU HAVE BEEN MOVING AROUND A LOT MORE THAN USUAL: 3
SUM OF ALL RESPONSES TO PHQ9 QUESTIONS 1 & 2: 6
6. FEELING BAD ABOUT YOURSELF - OR THAT YOU ARE A FAILURE OR HAVE LET YOURSELF OR YOUR FAMILY DOWN: 3
3. TROUBLE FALLING OR STAYING ASLEEP: 3
1. LITTLE INTEREST OR PLEASURE IN DOING THINGS: 3
10. IF YOU CHECKED OFF ANY PROBLEMS, HOW DIFFICULT HAVE THESE PROBLEMS MADE IT FOR YOU TO DO YOUR WORK, TAKE CARE OF THINGS AT HOME, OR GET ALONG WITH OTHER PEOPLE: 3
4. FEELING TIRED OR HAVING LITTLE ENERGY: 3
5. POOR APPETITE OR OVEREATING: 3
SUM OF ALL RESPONSES TO PHQ QUESTIONS 1-9: 24
7. TROUBLE CONCENTRATING ON THINGS, SUCH AS READING THE NEWSPAPER OR WATCHING TELEVISION: 3

## 2022-06-16 ASSESSMENT — SOCIAL DETERMINANTS OF HEALTH (SDOH): HOW HARD IS IT FOR YOU TO PAY FOR THE VERY BASICS LIKE FOOD, HOUSING, MEDICAL CARE, AND HEATING?: NOT HARD AT ALL

## 2022-06-16 NOTE — PROGRESS NOTES
Subjective:  Chief Complaint   Patient presents with    Leg Swelling     sores not painful started 1 year ago        HPI:  Patient states he has had rash present to the bilateral lower extremities for about 1 year. It is not painful or pruritic. It does seem to be spreading. The patient states he was feeling motivated to get this evaluated today so he wanted to come in. He also would like to discuss several chronic conditions, including narcolepsy, Raynaud's phenomenon, none of which have any acute symptomatology. ROS:  Positive and pertinent negatives as per HPI. All other systems are reviewed and negative. Current Outpatient Medications:     terbinafine (LAMISIL) 250 MG tablet, Take 1 tablet by mouth daily for 14 days, Disp: 14 tablet, Rfl: 0    lisinopril (PRINIVIL;ZESTRIL) 10 MG tablet, take 1 tablet by mouth once daily, Disp: 30 tablet, Rfl: 0    doxycycline hyclate (VIBRAMYCIN) 100 MG capsule, , Disp: , Rfl:     benzoyl peroxide-erythromycin (BENZAMYCIN) 5-3 % gel, , Disp: , Rfl:     mupirocin (BACTROBAN) 2 % ointment, , Disp: , Rfl:     furosemide (LASIX) 20 MG tablet, One tab daily PRN swelling (Patient not taking: Reported on 6/16/2022), Disp: 30 tablet, Rfl: 3   Allergies   Allergen Reactions    Pcn [Penicillins]      As a child         Objective:  Vitals:    06/16/22 1341   BP: (!) 142/84   Site: Left Upper Arm   Position: Sitting   Cuff Size: Large Adult   Pulse: 99   Resp: 18   Temp: 97.8 °F (36.6 °C)   TempSrc: Temporal   SpO2: 98%   Weight: 230 lb (104.3 kg)   Height: 5' 7\" (1.702 m)        Exam:  Const: Appears healthy and well developed. No signs of acute distress present. Vitals reviewed per triage. Head/Face: Normocephalic, atraumatic. Facies is symmetric. ENMT:  Nares are patent. Buccal mucosa is moist.  No inflammation or edema of the posterior oropharynx. Neck: Trachea midline. Cardiac: Regular rate and rhythm. Resp: No respiratory distress.   Lungs clear to auscultation bilaterally all lung fieds. Musculo: Patient moves extremities without pain or limitation. Skin: Skin is warm and dry. The patient does have circular, erythematous lesions to the lower extremities, many have central clearing consistent with tinea corporis. No evidence of cellulitis. Neuro: Alert and oriented x3. Speech is articulate and fluent. Psych: Mood/Affect: Patient's mood and affect is appropriate to situation. MARIA ISABEL was seen today for leg swelling. Diagnoses and all orders for this visit:    Tinea corporis  -     Comprehensive Metabolic Panel; Future  -     CBC with Auto Differential; Future  -     terbinafine (LAMISIL) 250 MG tablet; Take 1 tablet by mouth daily for 14 days    Lab work will be obtained today as it has been over 1 year since the patient has been seen. I have told him that if his LFTs come back elevated, we will not be able to continue the terbinafine. He is aware of this and we will hold off on filling it until we have lab results back. He will be scheduled to follow-up with his PCP for regular office visit.     Seen By:    TAMMY Garcia - CNP

## 2022-07-06 DIAGNOSIS — I10 ESSENTIAL HYPERTENSION: ICD-10-CM

## 2022-07-06 RX ORDER — LISINOPRIL 10 MG/1
TABLET ORAL
Qty: 30 TABLET | Refills: 0 | Status: SHIPPED
Start: 2022-07-06 | End: 2022-07-21 | Stop reason: SDUPTHER

## 2022-07-06 NOTE — TELEPHONE ENCOUNTER
Last Appointment:  5/27/2021  Future Appointments   Date Time Provider Vicente Mckeon   7/19/2022  9:00 AM Luann Rachel MD [facility]   8/31/2022  1:20 PM TAMMY River - CNP [facility]

## 2022-07-21 ENCOUNTER — TELEPHONE (OUTPATIENT)
Dept: PRIMARY CARE CLINIC | Age: 47
End: 2022-07-21

## 2022-07-21 DIAGNOSIS — I10 ESSENTIAL HYPERTENSION: ICD-10-CM

## 2022-07-21 RX ORDER — LISINOPRIL 10 MG/1
TABLET ORAL
Qty: 30 TABLET | Refills: 0 | Status: SHIPPED | OUTPATIENT
Start: 2022-07-21

## 2022-07-21 NOTE — TELEPHONE ENCOUNTER
Sent  Needs same day cancellation letter  I understand work can be unpredictable but he had just made the appt yesterday after no show Tuesday

## 2022-08-23 ENCOUNTER — TELEPHONE (OUTPATIENT)
Dept: SLEEP CENTER | Age: 47
End: 2022-08-23

## 2022-08-23 NOTE — TELEPHONE ENCOUNTER
Received VM from pt has not received his cpap machine yet. Ordered 6-2-22. This nurse reached out to [facility] to find out why. Awaiting return call. Pts appt moved out and changed to Dr Felton Klein d/t pt c/o possibly having \"narcolepsy\".

## 2023-09-14 ENCOUNTER — OFFICE VISIT (OUTPATIENT)
Dept: FAMILY MEDICINE CLINIC | Age: 48
End: 2023-09-14
Payer: COMMERCIAL

## 2023-09-14 VITALS
RESPIRATION RATE: 18 BRPM | SYSTOLIC BLOOD PRESSURE: 144 MMHG | BODY MASS INDEX: 37.67 KG/M2 | DIASTOLIC BLOOD PRESSURE: 90 MMHG | OXYGEN SATURATION: 98 % | TEMPERATURE: 98.6 F | HEIGHT: 67 IN | WEIGHT: 240 LBS | HEART RATE: 91 BPM

## 2023-09-14 DIAGNOSIS — L01.00 IMPETIGO: Primary | ICD-10-CM

## 2023-09-14 PROCEDURE — 99213 OFFICE O/P EST LOW 20 MIN: CPT | Performed by: NURSE PRACTITIONER

## 2023-09-14 RX ORDER — CEPHALEXIN 500 MG/1
500 CAPSULE ORAL 4 TIMES DAILY
Qty: 40 CAPSULE | Refills: 0 | Status: SHIPPED | OUTPATIENT
Start: 2023-09-14 | End: 2023-09-24

## 2024-08-19 ENCOUNTER — OFFICE VISIT (OUTPATIENT)
Dept: FAMILY MEDICINE CLINIC | Age: 49
End: 2024-08-19
Payer: COMMERCIAL

## 2024-08-19 VITALS
TEMPERATURE: 97.1 F | DIASTOLIC BLOOD PRESSURE: 90 MMHG | HEIGHT: 67 IN | BODY MASS INDEX: 38.92 KG/M2 | OXYGEN SATURATION: 99 % | WEIGHT: 248 LBS | RESPIRATION RATE: 18 BRPM | HEART RATE: 92 BPM | SYSTOLIC BLOOD PRESSURE: 146 MMHG

## 2024-08-19 DIAGNOSIS — L02.212 ABSCESS OF LOWER BACK: ICD-10-CM

## 2024-08-19 DIAGNOSIS — R11.2 NAUSEA AND VOMITING, UNSPECIFIED VOMITING TYPE: ICD-10-CM

## 2024-08-19 DIAGNOSIS — R50.9 FEVER AND CHILLS: ICD-10-CM

## 2024-08-19 DIAGNOSIS — L02.212 ABSCESS OF UPPER BACK EXCLUDING SCAPULAR REGION: Primary | ICD-10-CM

## 2024-08-19 DIAGNOSIS — R53.81 MALAISE: ICD-10-CM

## 2024-08-19 PROCEDURE — 99215 OFFICE O/P EST HI 40 MIN: CPT | Performed by: NURSE PRACTITIONER

## 2024-08-21 NOTE — PROGRESS NOTES
2024     Silas Menon 49 y.o. male    : 1975   Chief Complaint   Cyst      History of Present Illness   Source of history provided by:  patient.    Silas Menon is a 49 y.o. old male who presents to walk-in care for evaluation of redness to the upper and lower back, which began several days ago. Reports the area is warm to touch, moderately painful, and swollen. Denies lymphangitic streaking. Denies any bleeding or active drainage. Since onset the symptoms have progressed.  There is associated fever, Tmax 100.6, chills, nausea, vomiting and generalized malaise.       ROS   Past Medical History: No past medical history on file.  Past Surgical History:  has no past surgical history on file.  Social History:  reports that he has been smoking cigarettes. He has a 2 pack-year smoking history. He has never used smokeless tobacco. He reports current alcohol use. He reports current drug use. Drugs: Marijuana (Weed) and Methamphetamines (Crystal Meth).  Family History: family history includes Breast Cancer in his mother; Heart Attack in his father.   Allergies: Pcn [penicillins]    Unless otherwise stated in this report the patient's positive and negative responses for review of systems for constitutional, eyes, ENT, cardiovascular, respiratory, gastrointestinal, neurological, , musculoskeletal, and integument systems and related systems to the presenting problem are either stated in the history of present illness or were not pertinent or were negative for the symptoms and/or complaints related to the presenting medical problem.  Positives and pertinent negatives as per HPI.  All others reviewed and are negative.    Physical Exam     VS:     Vitals:    24 1347 24 1350   BP: (!) 146/90 (!) 146/90   Pulse: 92    Resp: 18    Temp: 97.1 °F (36.2 °C)    TempSrc: Temporal    SpO2: 99%    Weight: 112.5 kg (248 lb)    Height: 1.702 m (5' 7\")      Oxygen Saturation Interpretation: